# Patient Record
Sex: MALE | Race: WHITE
[De-identification: names, ages, dates, MRNs, and addresses within clinical notes are randomized per-mention and may not be internally consistent; named-entity substitution may affect disease eponyms.]

---

## 2018-11-10 ENCOUNTER — HOSPITAL ENCOUNTER (INPATIENT)
Dept: HOSPITAL 31 - C.ER | Age: 43
LOS: 6 days | Discharge: HOME | DRG: 383 | End: 2018-11-16
Attending: HOSPITALIST | Admitting: HOSPITALIST
Payer: MEDICAID

## 2018-11-10 VITALS — BODY MASS INDEX: 27.1 KG/M2

## 2018-11-10 DIAGNOSIS — N18.9: ICD-10-CM

## 2018-11-10 DIAGNOSIS — L98.0: Primary | ICD-10-CM

## 2018-11-10 DIAGNOSIS — L03.012: ICD-10-CM

## 2018-11-10 DIAGNOSIS — B95.62: ICD-10-CM

## 2018-11-10 DIAGNOSIS — L03.114: ICD-10-CM

## 2018-11-10 DIAGNOSIS — L02.512: ICD-10-CM

## 2018-11-10 LAB
ALBUMIN SERPL-MCNC: 4.2 G/DL (ref 3.5–5)
ALBUMIN/GLOB SERPL: 1.3 {RATIO} (ref 1–2.1)
ALT SERPL-CCNC: 31 U/L (ref 21–72)
APTT BLD: 33 SECONDS (ref 21–34)
AST SERPL-CCNC: 24 U/L (ref 17–59)
BASOPHILS # BLD AUTO: 0.1 K/UL (ref 0–0.2)
BASOPHILS NFR BLD: 1.5 % (ref 0–2)
BILIRUB UR-MCNC: NEGATIVE MG/DL
BUN SERPL-MCNC: 14 MG/DL (ref 9–20)
CALCIUM SERPL-MCNC: 8.8 MG/DL (ref 8.6–10.4)
EOSINOPHIL # BLD AUTO: 0.2 K/UL (ref 0–0.7)
EOSINOPHIL NFR BLD: 2.3 % (ref 0–4)
ERYTHROCYTE [DISTWIDTH] IN BLOOD BY AUTOMATED COUNT: 12.6 % (ref 11.5–14.5)
GFR NON-AFRICAN AMERICAN: > 60
GLUCOSE UR STRIP-MCNC: NORMAL MG/DL
HGB BLD-MCNC: 14.8 G/DL (ref 12–18)
INR PPP: 1.2
LEUKOCYTE ESTERASE UR-ACNC: (no result) LEU/UL
LYMPHOCYTES # BLD AUTO: 1.9 K/UL (ref 1–4.3)
LYMPHOCYTES NFR BLD AUTO: 24.7 % (ref 20–40)
MCH RBC QN AUTO: 30.5 PG (ref 27–31)
MCHC RBC AUTO-ENTMCNC: 34.6 G/DL (ref 33–37)
MCV RBC AUTO: 88.2 FL (ref 80–94)
MONOCYTES # BLD: 0.5 K/UL (ref 0–0.8)
MONOCYTES NFR BLD: 6.9 % (ref 0–10)
NEUTROPHILS # BLD: 4.9 K/UL (ref 1.8–7)
NEUTROPHILS NFR BLD AUTO: 64.6 % (ref 50–75)
NRBC BLD AUTO-RTO: 0 % (ref 0–2)
PH UR STRIP: 6 [PH] (ref 5–8)
PLATELET # BLD: 276 K/UL (ref 130–400)
PMV BLD AUTO: 7.7 FL (ref 7.2–11.7)
PROT UR STRIP-MCNC: NEGATIVE MG/DL
PROTHROMBIN TIME: 12.7 SECONDS (ref 9.7–12.2)
RBC # BLD AUTO: 4.84 MIL/UL (ref 4.4–5.9)
RBC # UR STRIP: NEGATIVE /UL
SP GR UR STRIP: 1.01 (ref 1–1.03)
UROBILINOGEN UR-MCNC: NORMAL MG/DL (ref 0.2–1)
WBC # BLD AUTO: 7.6 K/UL (ref 4.8–10.8)

## 2018-11-10 RX ADMIN — TAZOBACTAM SODIUM AND PIPERACILLIN SODIUM SCH MLS/HR: 375; 3 INJECTION, SOLUTION INTRAVENOUS at 23:05

## 2018-11-10 NOTE — C.PDOC
History Of Present Illness





42 y/o male with no pmx c/o swelling to left hand/thumb area with pus and 

bleeding since earlier this week.  pt states it was small earlier in the week, 

and got worse after wearing a glove that rubbed on it, and putting aloe vera on 

it. pt has been taking keflex bid for since 11/6 with no improvement. no fever. 


Time Seen by Provider: 11/10/18 14:10


Chief Complaint (Nursing): Finger,Hand,&Wrist


History Per: Patient


History/Exam Limitations: no limitations


Onset/Duration Of Symptoms: Days (6)


Current Symptoms Are (Timing): Worse


Quality: "Pain"


Severity: Moderate





Past Medical History


Reviewed: Historical Data, Nursing Documentation, Vital Signs


Vital Signs: 





                                Last Vital Signs











Temp  98.4 F   11/10/18 13:55


 


Pulse  70   11/10/18 15:01


 


Resp  18   11/10/18 15:01


 


BP  136/86   11/10/18 15:01


 


Pulse Ox  97   11/10/18 15:01














- Medical History


PMH: Kidney Stones, Chronic Kidney Disease


Family History: States: Unknown Family Hx, Diabetes





- Social History


Hx Tobacco Use: No


Hx Alcohol Use: Yes


Hx Substance Use: No





- Immunization History


Hx Tetanus Toxoid Vaccination: No


Hx Influenza Vaccination: No


Hx Pneumococcal Vaccination: No





Review Of Systems


Constitutional: Negative for: Fever, Chills


Cardiovascular: Negative for: Chest Pain


Respiratory: Negative for: Cough


Gastrointestinal: Negative for: Abdominal Pain


Skin: Positive for: Lesions (to left hand)


Neurological: Negative for: Weakness, Numbness





Physical Exam





- Physical Exam


Appears: Non-toxic, No Acute Distress


Skin: Warm, Dry, Other (6 cm x 2 cm mass extruding from left  first metacarpal 

that is purulent and bloody , lesion with surrounding erythema, warmth, and 

swelling that extends to dorsum hand and thenar eminence. from of thumb. )


Head: Atraumatic, Normacephalic


Extremity: Normal ROM, Tenderness (left hand), Capillary Refill (less than 2 

seconds. ), Swelling


Neurological/Psych: Oriented x3, Normal Speech, Normal Cognition





ED Course And Treatment





- Laboratory Results


Result Diagrams: 


                                 11/10/18 15:51





                                 11/10/18 15:51


O2 Sat by Pulse Oximetry: 97





Medical Decision Making


Medical Decision Making: 





discussed with Dr Borker, will admit to her service for cellulitis.  Dr Webster 

made aware of consult, requests hand soaked, then wt to dry dressing applied. 





Disposition





- Disposition


Disposition: HOSPITALIZED


Disposition Time: 17:15


Condition: GOOD





- Clinical Impression


Clinical Impression: 


 Cellulitis of hand, left

## 2018-11-11 VITALS — RESPIRATION RATE: 20 BRPM

## 2018-11-11 LAB
ALBUMIN SERPL-MCNC: 3.7 G/DL (ref 3.5–5)
ALBUMIN/GLOB SERPL: 1.2 {RATIO} (ref 1–2.1)
ALT SERPL-CCNC: 28 U/L (ref 21–72)
AST SERPL-CCNC: 23 U/L (ref 17–59)
BASOPHILS # BLD AUTO: 0.1 K/UL (ref 0–0.2)
BASOPHILS NFR BLD: 1.2 % (ref 0–2)
BUN SERPL-MCNC: 14 MG/DL (ref 9–20)
CALCIUM SERPL-MCNC: 8.7 MG/DL (ref 8.6–10.4)
EOSINOPHIL # BLD AUTO: 0.3 K/UL (ref 0–0.7)
EOSINOPHIL NFR BLD: 3.2 % (ref 0–4)
ERYTHROCYTE [DISTWIDTH] IN BLOOD BY AUTOMATED COUNT: 12.6 % (ref 11.5–14.5)
GFR NON-AFRICAN AMERICAN: > 60
HGB BLD-MCNC: 14.5 G/DL (ref 12–18)
LYMPHOCYTES # BLD AUTO: 1.8 K/UL (ref 1–4.3)
LYMPHOCYTES NFR BLD AUTO: 22.1 % (ref 20–40)
MCH RBC QN AUTO: 31.1 PG (ref 27–31)
MCHC RBC AUTO-ENTMCNC: 35.1 G/DL (ref 33–37)
MCV RBC AUTO: 88.7 FL (ref 80–94)
MONOCYTES # BLD: 0.6 K/UL (ref 0–0.8)
MONOCYTES NFR BLD: 7.3 % (ref 0–10)
NEUTROPHILS # BLD: 5.3 K/UL (ref 1.8–7)
NEUTROPHILS NFR BLD AUTO: 66.2 % (ref 50–75)
NRBC BLD AUTO-RTO: 0 % (ref 0–2)
PLATELET # BLD: 261 K/UL (ref 130–400)
PMV BLD AUTO: 8.1 FL (ref 7.2–11.7)
RBC # BLD AUTO: 4.66 MIL/UL (ref 4.4–5.9)
WBC # BLD AUTO: 8 K/UL (ref 4.8–10.8)

## 2018-11-11 RX ADMIN — TAZOBACTAM SODIUM AND PIPERACILLIN SODIUM SCH MLS/HR: 375; 3 INJECTION, SOLUTION INTRAVENOUS at 10:46

## 2018-11-11 RX ADMIN — TAZOBACTAM SODIUM AND PIPERACILLIN SODIUM SCH MLS/HR: 375; 3 INJECTION, SOLUTION INTRAVENOUS at 17:56

## 2018-11-11 RX ADMIN — TAZOBACTAM SODIUM AND PIPERACILLIN SODIUM SCH MLS/HR: 375; 3 INJECTION, SOLUTION INTRAVENOUS at 04:36

## 2018-11-11 RX ADMIN — TAZOBACTAM SODIUM AND PIPERACILLIN SODIUM SCH MLS/HR: 375; 3 INJECTION, SOLUTION INTRAVENOUS at 22:22

## 2018-11-11 RX ADMIN — VANCOMYCIN HYDROCHLORIDE SCH MLS/HR: 1 INJECTION, POWDER, LYOPHILIZED, FOR SOLUTION INTRAVENOUS at 10:45

## 2018-11-11 NOTE — RAD
Date of service: 



11/10/2018



HISTORY:

 Pre-Op 



COMPARISON:

No prior



TECHNIQUE:

Chest PA and lateral



FINDINGS:



LUNGS:

Poor inspiration with low lung volumes, crowded bronchovascular 

markings and mild bibasilar atelectasis. 



PLEURA:

No significant pleural effusion identified. No pneumothorax apparent.



CARDIOVASCULAR:

No aortic atherosclerotic calcification present.



Normal cardiac size. No pulmonary vascular congestion. 



OSSEOUS STRUCTURES:

No significant abnormalities.



VISUALIZED UPPER ABDOMEN:

Normal.



OTHER FINDINGS:

None.



IMPRESSION:

Poor inspiration with low lung volumes, crowded bronchovascular 

markings and mild bibasilar atelectasis. .

## 2018-11-11 NOTE — CP.PCM.CON
History of Present Illness





- History of Present Illness


History of Present Illness: 





 43 year old male  presents with complaints left hand/thumb swelling/pain   Says

his wound began as a bump on the evening of 11/5/18 and worsened.   He 

eventually went to a clinic and was prescribed Keflex 500 BID, which he has been

taking since 11/6.  Pain and swelling persisted 











PMHx: Nephrolithiasis


PSHx: Possible Colonoscopy 7-8 years ago


Allergies: NKDA


SocialHx: Works as a construction work, smokes Cigars social (hasn't smoked any 

in 3 months), Social EtOH Use


FamHx: Father/Mother - HTN, Diabetes








Review of Systems





- Review of Systems


All systems: reviewed and no additional remarkable complaints except





- Constitutional


Constitutional: As Per HPI





- EENT


Eyes: absent: As Per HPI, Blind Spots, Blurred Vision, Change in Vision, 

Decreased Night Vision, Diplopia, Discharge, Dry Eye, Exophthalmos, Floaters, 

Irritation, Itchy Eyes, Loss of Peripheral Vision, Pain, Photophobia, Requires 

Corrective Lenses, Sees Flashes, Spots in Vision, Tunnel Vision, Other Visual 

Disturbances, Loss of Vision, Other


Ears: absent: As Per HPI, Decreased Hearing, Ear Discharge, Ear Pain, Tinnitus, 

Abnormal Hearing, Disequilibrium, Dizziness, Other


Nose/Mouth/Throat: absent: As Per HPI, Epistaxis, Nasal Congestion, Nasal 

Discharge, Nasal Obstruction, Nasal Trauma, Nose Pain, Post Nasal Drip, Sinus 

Pain, Sinus Pressure, Bleeding Gums, Change in Voice, Dental Pain, Dry Mouth, 

Dysphagia, Halitosis, Hoarsness, Lip Swelling, Mouth Lesions, Mouth Pain, 

Odynophagia, Sore Throat, Throat Swelling, Tongue Swelling, Facial Pain, Neck 

Pain, Neck Mass, Other





- Cardiovascular


Cardiovascular: absent: As Per HPI, Acrocyanosis, Chest Pain, Chest Pain at 

Rest, Chest Pain with Activity, Claudication, Diaphoresis, Dyspnea, Dyspnea on 

Exertion, Edema, Irregular Heart Rhythm, Pain Radiating to Arm/Neck/Jaw, Leg 

Edema, Leg Ulcers, Lightheadedness, Orthopnea, Palpitations, Paroxysmal Noctur

nal Dyspnea, Pedal Edema, Radiating Pain, Rapid Heart Rate, Slow Heart Rate, 

Syncope, Other





- Respiratory


Respiratory: absent: As Per HPI, Cough, Dyspnea, Hemoptysis, Dyspnea on 

Exertion, Wheezing, Snoring, Stridor, Pain on Inspiration, Chest Congestion, 

Excessive Mucous Production, Change in Mucous Color, Pain with Coughing, Other





- Gastrointestinal


Gastrointestinal: absent: As Per HPI, Abdominal Pain, Belching, Bloating, Change

in Bowel Habits, Change in Stool Character, Coffee Ground Emesis, Constipation, 

Cramping, Diarrhea, Dyspepsia, Dysphagia, Early Satiety, Excessive Flatus, Fecal

Incontinence, Heartburn, Hematemesis, Hematochezia, Loose Stools, Melena, 

Nausea, Odynophagia, Temesmus, Vomiting, Other





- Genitourinary


Genitourinary: absent: As Per HPI, Change in Urinary Stream, Difficulty 

Urinating, Dysuria, Flank Pain, Hematuria, Pyuria, Nocturia, Urinary Incontinen

ce, Urinary Frequency, Urinary Hesitance, Urinary Urgency, Voiding Freq/Small 

Amts, Freq UTI, Hx Renal/Bladder Calculi, Hx /Renal Surgery, Bladder 

Distension, Other





- Musculoskeletal


Musculoskeletal: As Per HPI





- Integumentary


Integumentary: As Per HPI, Skin Pain, Wounds





- Neurological


Neurological: absent: As Per HPI, Abnormal Gait, Abnormal Hearing, Abnormal 

Movements, Abnormal Speech, Behavioral Changes, Burning Sensations, Confusion, 

Convulsions, Disequilibrium, Dizziness, Numbness, Focal Weakness, Frequent 

Falls, Headaches, Lack of Coordination, Loss of Vision, Memory Loss, 

Paresthesias, Radicular Pain, Restless Legs, Sensory Deficit, Syncope, Tingling,

Tremor, Vertigo, Weakness, Other Visual Disturbances, Other





- Psychiatric


Psychiatric: absent: As Per HPI, Abnormal Sleep Pattern, Anhedonia, Anxiety, 

Auditory Hallucinations, Behavioral Changes, Change in Appetite, Change in 

Libido, Confusion, Depression, Difficulty Concentrating, Hallucinations, 

Homicidal Ideation, Hopelessness, Irritability, Memory Loss, Mood Swings, Panic 

Attacks, Paranoia, Suicidal Ideation, Visual Hallucinations, Tactile 

Hallucinations, Other





- Endocrine


Endocrine: absent: As Per HPI, Change in Body Appearance, Change in Libido, Cold

Intolorance, Deepening of Voice, Excessive Sweating, Fatigue, Flushing, Heat 

Intolorance, Increase in Ring/Shoe/Hat Size, Palpitations, Polydipsia, 

Polyphagia, Polyuria, Other





- Hematologic/Lymphatic


Hematologic: absent: As Per HPI, Easy Bleeding, Easy Bruising, Lymphadenopathy, 

Other





Past Patient History





- Past Social History


Smoking Status: Never Smoked





- RENAL


Hx Chronic Kidney Disease: Yes


Hx Kidney Stones: Yes





- MUSCULOSKELETAL/RHEUMATOLOGICAL


Hx Falls: No





- PSYCHIATRIC


Hx Substance Use: No





- SURGICAL HISTORY


Hx Surgeries: No





- ANESTHESIA


Hx Anesthesia: No


Hx Anesthesia Reactions: No


Hx Malignant Hyperthermia: No





Meds


Allergies/Adverse Reactions: 


                                    Allergies











Allergy/AdvReac Type Severity Reaction Status Date / Time


 


No Known Allergies Allergy   Verified 11/10/18 13:53














- Medications


Medications: 


                               Current Medications





Acetaminophen (Tylenol 325mg Tab)  650 mg PO Q6 PRN


   PRN Reason: Pain or Fever


Vancomycin/Sodium Chloride (Vancomycin 1 Gm/Ns 200 Ml)  1 gm in 200 mls @ 166.7 

mls/hr IVPB Q24H PUNEET; Protocol


   Stop: 11/16/18 10:01


   Last Admin: 11/11/18 10:45 Dose:  166.7 mls/hr


Piperacillin Sod/Tazobactam Sod (Zosyn 3.375 Gm Iv Premix)  3.375 gm in 50 mls @

100 mls/hr IVPB Q6H PUNEET; Protocol


   Last Admin: 11/11/18 10:46 Dose:  100 mls/hr


Sodium Chloride (Sodium Chloride 0.9%)  1,000 mls @ 75 mls/hr IV .[id number] PUNEET


   Last Admin: 11/11/18 14:36 Dose:  75 mls/hr











Physical Exam





- Constitutional


Appears: Non-toxic, Chronically Ill





- Head Exam


Head Exam: NORMOCEPHALIC





- Eye Exam


Eye Exam: absent: Scleral icterus





- ENT Exam


ENT Exam: Mucous Membranes Dry





- Neck Exam


Neck exam: Negative for: Lymphadenopathy





- Respiratory Exam


Respiratory Exam: Decreased Breath Sounds





- Cardiovascular Exam


Cardiovascular Exam: REGULAR RHYTHM





- GI/Abdominal Exam


GI & Abdominal Exam: Diminished Bowel Sounds, Soft.  absent: Tenderness





- Rectal Exam


Rectal Exam: Deferred





-  Exam


 Exam: NORMAL INSPECTION





- Extremities Exam


Extremities exam: Negative for: pedal edema





- Back Exam


Back exam: absent: CVA tenderness (L), CVA tenderness (R)





- Neurological Exam


Neurological exam: Alert, CN II-XII Intact, Oriented x3, Reflexes Normal





- Psychiatric Exam


Psychiatric exam: Normal Mood





- Skin


Additional comments: 





+ erythema  swelling and pus from left hand


pulses and neurologic exam wnl 





Results





- Vital Signs


Recent Vital Signs: 


                                Last Vital Signs











Temp  99.3 F   11/11/18 16:00


 


Pulse  68   11/11/18 16:00


 


Resp  20   11/11/18 16:00


 


BP  121/79   11/11/18 16:00


 


Pulse Ox  96   11/11/18 16:00














- Labs


Result Diagrams: 


                                 11/11/18 08:17





                                 11/11/18 08:17


Labs: 


                         Laboratory Results - last 24 hr











  11/10/18 11/11/18 11/11/18





  20:03 08:17 08:17


 


WBC   8.0 


 


RBC   4.66 


 


Hgb   14.5 


 


Hct   41.4 


 


MCV   88.7 


 


MCH   31.1 H 


 


MCHC   35.1 


 


RDW   12.6 


 


Plt Count   261 


 


MPV   8.1 


 


Neut % (Auto)   66.2 


 


Lymph % (Auto)   22.1 


 


Mono % (Auto)   7.3 


 


Eos % (Auto)   3.2 


 


Baso % (Auto)   1.2 


 


Neut # (Auto)   5.3 


 


Lymph # (Auto)   1.8 


 


Mono # (Auto)   0.6 


 


Eos # (Auto)   0.3 


 


Baso # (Auto)   0.1 


 


Sodium    140


 


Potassium    3.6


 


Chloride    103


 


Carbon Dioxide    28


 


Anion Gap    12


 


BUN    14


 


Creatinine    0.9


 


Est GFR ( Amer)    > 60


 


Est GFR (Non-Af Amer)    > 60


 


Random Glucose    86


 


Hemoglobin A1c  5.6  


 


Calcium    8.7


 


Phosphorus    3.9


 


Magnesium    2.1


 


Total Bilirubin    1.1


 


AST    23


 


ALT    28


 


Alkaline Phosphatase    74


 


Total Protein    6.8


 


Albumin    3.7


 


Globulin    3.1


 


Albumin/Globulin Ratio    1.2














Assessment & Plan


(1) Cellulitis of hand, left


Status: Acute   





- Assessment and Plan (Free Text)


Assessment: 





abscess left hand with cellulitis- draining 





consider I and D  if swelling persists 


IV antibiotics


await cultures


wound care


tetanus toxoid if not given

## 2018-11-11 NOTE — CP.PCM.PN
<Leodan Sierra - Last Filed: 11/11/18 13:50>





Subjective





- Date & Time of Evaluation


Date of Evaluation: 11/11/18


Time of Evaluation: 10:40





- Subjective


Subjective: 


Patient seen and examined at bedside. No overnight events reported. Patient 

denies any fever, chills, chest pain, SOB, abdominal pain, n/v/d, constipation, 

or any urinary symptoms.  Hand pain has decreased.  








Objective





- Vital Signs/Intake and Output


Vital Signs (last 24 hours): 


                                        











Temp Pulse Resp BP Pulse Ox


 


 98.9 F   58 L  20   117/77   95 


 


 11/11/18 08:00  11/11/18 08:00  11/11/18 08:00  11/11/18 08:00  11/11/18 08:00








Intake and Output: 


                                        











 11/11/18 11/11/18





 06:59 18:59


 


Intake Total 1000 


 


Balance 1000 














- Medications


Medications: 


                               Current Medications





Acetaminophen (Tylenol 325mg Tab)  650 mg PO Q6 PRN


   PRN Reason: Pain or Fever


Vancomycin/Sodium Chloride (Vancomycin 1 Gm/Ns 200 Ml)  1 gm in 200 mls @ 166.7 

mls/hr IVPB Q24H PUNEET; Protocol


   Stop: 11/16/18 10:01


   Last Admin: 11/11/18 10:45 Dose:  166.7 mls/hr


Piperacillin Sod/Tazobactam Sod (Zosyn 3.375 Gm Iv Premix)  3.375 gm in 50 mls @

100 mls/hr IVPB Q6H PUNEET; Protocol


   Last Admin: 11/11/18 10:46 Dose:  100 mls/hr


Sodium Chloride (Sodium Chloride 0.9%)  1,000 mls @ 125 mls/hr IV .Q8H PUNEET


   Last Admin: 11/11/18 03:02 Dose:  125 mls/hr











- Labs


Labs: 


                                        





                                 11/11/18 08:17 





                                 11/11/18 08:17 





                                        











PT  12.7 SECONDS (9.7-12.2)  H  11/10/18  16:43    


 


INR  1.2   11/10/18  16:43    


 


APTT  33 SECONDS (21-34)   11/10/18  16:43    














- Additional Findings


Additional findings: 


- Constitutional


Appears: Well, Non-toxic, No Acute Distress





- Head Exam


Head Exam: ATRAUMATIC, NORMAL INSPECTION, NORMOCEPHALIC





- Eye Exam


Eye Exam: EOMI, Normal appearance.  absent: Scleral icterus





- ENT Exam


ENT Exam: Mucous Membranes Moist





- Neck Exam


Neck exam: Positive for: Normal Inspection.  Negative for: Lymphadenopathy





- Respiratory Exam


Respiratory Exam: Clear to Auscultation Bilateral, NORMAL BREATHING PATTERN.  

absent: Rales, Rhonchi, Wheezes





- Cardiovascular Exam


Cardiovascular Exam: RRR, +S1, +S2





- GI/Abdominal Exam


GI & Abdominal Exam: Normal Bowel Sounds, Soft.  absent: Tenderness





- Extremities Exam


Extremities exam: Positive for: normal capillary refill, pedal pulses present.  

Negative for: pedal edema


Additional comments: 


Left Hand/Thumb. Swollen, Erythematous with sanguinopurulent drainage. Tender to

palpation. Irregular border 


NO axillary lymphadenopathy


Left axilla with two papular, erythematous masses about .5inch in diameter each.

Tender to palpation, fluctuant. 





- Back Exam


Back exam: NORMAL INSPECTION





- Neurological Exam


Neurological exam: Alert, Oriented x3





- Psychiatric Exam


Psychiatric exam: Normal Affect, Normal Mood





- Skin


Skin Exam: Warm








Assessment and Plan





- Assessment and Plan (Free Text)


Assessment: 


43 year old male with PMHx of nephrolithiasis admitted for evaluation and 

treatment of left hand/thumb cellulitis.  





Plan: 


Left Hand/Thumb Cellulitis


No fever/Leukocytosis


Left Hand X-ray (Admission): PENDING Official Read, No fractures, dislocations, 

or evidence of osteomyelitis noted.  


Left Hand CT (11/10/18): Findings are consistent with a small elliptical shaped 

abscess collection with surrounding is infiltration changes consistent with 

cellulitis in the soft tissues of the lateral hand (radial aspect) at the level 

of the 1st metacarpal extending from distal metacarpal proximally to the level 

of the triquetrum..  No evidence of subcutaneous emphysema.  No definitive bony 

destructive changes suggest osteomyelitis at this time however early 

osteomyelitis not excluded.  Follow-up MRI may be prudent for further evaluation


Hand Surgery Consulted (Dr. Thomson), F/U. ---Wet to Dry Dressing 


ID Consulted (Dr. Toro), F/U


ED: Clindamycin, Zosyn, Vancomycin,


Wound Culture (11/10/18): POSITIVE for Gram (+) Gocci


Blood Cultures: PENDING 





Meds:


   Vancomycin 1 gram Daily IV


   Zosyn 3.375 Q6H IV 


   Tylenol


   NS @ 75mls/HR


   Tdap IM Given Once 





Proph


NO pharmocological DVT proph in case of surgery


SCD's


GI Proph Not Indicated 








<Husam George - Last Filed: 11/11/18 14:35>





Objective





- Vital Signs/Intake and Output


Vital Signs (last 24 hours): 


                                        











Temp Pulse Resp BP Pulse Ox


 


 98.9 F   58 L  20   117/77   95 


 


 11/11/18 08:00  11/11/18 08:00  11/11/18 08:00  11/11/18 08:00  11/11/18 08:00








Intake and Output: 


                                        











 11/11/18 11/11/18





 06:59 18:59


 


Intake Total 1000 


 


Balance 1000 














- Medications


Medications: 


                               Current Medications





Acetaminophen (Tylenol 325mg Tab)  650 mg PO Q6 PRN


   PRN Reason: Pain or Fever


Vancomycin/Sodium Chloride (Vancomycin 1 Gm/Ns 200 Ml)  1 gm in 200 mls @ 166.7 

mls/hr IVPB Q24H PUNEET; Protocol


   Stop: 11/16/18 10:01


   Last Admin: 11/11/18 10:45 Dose:  166.7 mls/hr


Piperacillin Sod/Tazobactam Sod (Zosyn 3.375 Gm Iv Premix)  3.375 gm in 50 mls @

100 mls/hr IVPB Q6H PUNEET; Protocol


   Last Admin: 11/11/18 10:46 Dose:  100 mls/hr


Sodium Chloride (Sodium Chloride 0.9%)  1,000 mls @ 75 mls/hr IV .[id number] PUNEET











- Labs


Labs: 


                                        





                                 11/11/18 08:17 





                                 11/11/18 08:17 





                                        











PT  12.7 SECONDS (9.7-12.2)  H  11/10/18  16:43    


 


INR  1.2   11/10/18  16:43    


 


APTT  33 SECONDS (21-34)   11/10/18  16:43    














Attending/Attestation





- Attestation


I have personally seen and examined this patient.: Yes


I have fully participated in the care of the patient.: Yes


I have reviewed all pertinent clinical information, including history, physical 

exam and plan: Yes


Notes (Text): 





Patient seen and examined, agree with above


improved pain, continue with supportive care and broad spectrum Abx until blood 

cx reports available


will likely need I&D

## 2018-11-11 NOTE — CT
Date of service: 



11/10/2018



PROCEDURE:  CT of the left hand 



HISTORY:

Left Hand Cellulitis



COMPARISON:

None available.



TECHNIQUE:

Contiguous axial images of the le left hand hip were obtained 

following intravenous injection of approximately 100 cc Visipaque 

320.  Coronal and sagittal reformats were generated.



Radiation dose:



Total exam DLP = 279.11 mGy-cm.



This CT exam was performed using one or more of the following dose 

reduction techniques: Automated exposure control, adjustment of the 

mA and/or kV according to patient size, and/or use of iterative 

reconstruction technique.



FINDINGS:



BONES:

The current study reveals no evidence of acute displaced fracture nor 

dislocation.  The osseous structures appear intact.  No obvious 

cortical destructive changes seen at this time to suggest 

osteomyelitis however consider follow-up MRI if early osteomyelitis 

suspected clinically as this cannot be completely excluded based on 

the current study.



No evidence of significant osteoarthritis. 



SOFT TISSUES:

There is a long somewhat rectangular/elliptical shaped hypodense 

fluid collection within the lateral (radial aspect) soft tissues of 

the left hand extending from the distal aspect of the 1st metacarpal 

proximally to the level of the triquetrum.  This collection measures 

approximately 3.8 x 1.39 x 0.74 cm.  Collection extends to the 

lateral volar skin surface with possible draining site best seen on 

axial series 3 image number 128 through 132.  There is surrounding 

infiltration changes in the adjacent subcutaneous tissues and 

musculature consistent with cellulitis.. 



No evidence of subcutaneous emphysema 



IMPRESSION:

Findings are consistent with a small elliptical shaped abscess 

collection with surrounding is infiltration changes consistent with 

cellulitis in the soft tissues of the lateral hand (radial aspect) at 

the level of the 1st metacarpal extending from distal metacarpal 

proximally to the level of the triquetrum..  No evidence of 

subcutaneous emphysema.  No definitive bony destructive changes 

suggest osteomyelitis at this time however early osteomyelitis not 

excluded.  Follow-up MRI may be prudent for further evaluation



These findings are concordant with preliminary radiology reading.

## 2018-11-11 NOTE — RAD
PROCEDURE:  Left Hand Radiographs.



HISTORY:

 mass on thumb 



COMPARISON:

None.



FINDINGS:



BONES:

No evidence of acute displaced fracture nor dislocation.  Osseous 

structures appear intact.  No definitive cortical destructive 

changes.. 



JOINTS:

Normal. No osteoarthritic changes. 



SOFT TISSUES:

There is localized enlarged heterogeneous appearance of the soft 

tissues adjacent to the radial margin of the 1st metacarpal (with 

presumed involvement of the thenar eminence) and extending to the 

level of the triquetrum.  Findings may represent a cellulitis and 

possible abscess.  Follow-up additional imaging such as CT scan with 

contrast recommended. 



OTHER FINDINGS:

None.



IMPRESSION:

There is localized enlarged heterogeneous appearance of the soft 

tissues adjacent to the radial margin of the 1st metacarpal (with 

presumed involvement of the thenar eminence) and extending to the 

level of the triquetrum.  Findings may represent a cellulitis and 

possible abscess.  Follow-up additional imaging such as CT scan with 

contrast recommended.

## 2018-11-12 LAB
ALBUMIN SERPL-MCNC: 3.7 G/DL (ref 3.5–5)
ALBUMIN/GLOB SERPL: 1.2 {RATIO} (ref 1–2.1)
ALT SERPL-CCNC: 27 U/L (ref 21–72)
AST SERPL-CCNC: 24 U/L (ref 17–59)
BASOPHILS # BLD AUTO: 0.1 K/UL (ref 0–0.2)
BASOPHILS NFR BLD: 1 % (ref 0–2)
BUN SERPL-MCNC: 14 MG/DL (ref 9–20)
CALCIUM SERPL-MCNC: 8.5 MG/DL (ref 8.6–10.4)
EOSINOPHIL # BLD AUTO: 0.3 K/UL (ref 0–0.7)
EOSINOPHIL NFR BLD: 3.4 % (ref 0–4)
ERYTHROCYTE [DISTWIDTH] IN BLOOD BY AUTOMATED COUNT: 12.5 % (ref 11.5–14.5)
GFR NON-AFRICAN AMERICAN: > 60
HGB BLD-MCNC: 14.5 G/DL (ref 12–18)
LYMPHOCYTES # BLD AUTO: 1.9 K/UL (ref 1–4.3)
LYMPHOCYTES NFR BLD AUTO: 23.3 % (ref 20–40)
MCH RBC QN AUTO: 30.9 PG (ref 27–31)
MCHC RBC AUTO-ENTMCNC: 34.8 G/DL (ref 33–37)
MCV RBC AUTO: 88.9 FL (ref 80–94)
MONOCYTES # BLD: 0.7 K/UL (ref 0–0.8)
MONOCYTES NFR BLD: 8.1 % (ref 0–10)
NEUTROPHILS # BLD: 5.3 K/UL (ref 1.8–7)
NEUTROPHILS NFR BLD AUTO: 64.2 % (ref 50–75)
NRBC BLD AUTO-RTO: 0.1 % (ref 0–2)
PLATELET # BLD: 258 K/UL (ref 130–400)
PMV BLD AUTO: 8 FL (ref 7.2–11.7)
RBC # BLD AUTO: 4.7 MIL/UL (ref 4.4–5.9)
WBC # BLD AUTO: 8.3 K/UL (ref 4.8–10.8)

## 2018-11-12 RX ADMIN — TAZOBACTAM SODIUM AND PIPERACILLIN SODIUM SCH MLS/HR: 375; 3 INJECTION, SOLUTION INTRAVENOUS at 11:55

## 2018-11-12 RX ADMIN — VANCOMYCIN HYDROCHLORIDE SCH: 1 INJECTION, POWDER, LYOPHILIZED, FOR SOLUTION INTRAVENOUS at 12:23

## 2018-11-12 RX ADMIN — VANCOMYCIN HYDROCHLORIDE SCH MLS/HR: 1 INJECTION, POWDER, LYOPHILIZED, FOR SOLUTION INTRAVENOUS at 23:32

## 2018-11-12 RX ADMIN — TAZOBACTAM SODIUM AND PIPERACILLIN SODIUM SCH MLS/HR: 375; 3 INJECTION, SOLUTION INTRAVENOUS at 05:05

## 2018-11-12 RX ADMIN — TAZOBACTAM SODIUM AND PIPERACILLIN SODIUM SCH MLS/HR: 375; 3 INJECTION, SOLUTION INTRAVENOUS at 22:13

## 2018-11-12 RX ADMIN — TAZOBACTAM SODIUM AND PIPERACILLIN SODIUM SCH MLS/HR: 375; 3 INJECTION, SOLUTION INTRAVENOUS at 17:54

## 2018-11-12 RX ADMIN — VANCOMYCIN HYDROCHLORIDE SCH MLS/HR: 1 INJECTION, POWDER, LYOPHILIZED, FOR SOLUTION INTRAVENOUS at 10:16

## 2018-11-12 NOTE — CP.PCM.PN
Subjective





- Date & Time of Evaluation


Date of Evaluation: 11/12/18


Time of Evaluation: 08:00





- Subjective


Subjective: 





wound + for MRSA


consider MRI


cont Vanco for now


surgical eval 








Objective





- Vital Signs/Intake and Output


Vital Signs (last 24 hours): 


                                        











Temp Pulse Resp BP Pulse Ox


 


 98.5 F   73   20   131/88   95 


 


 11/12/18 07:00  11/12/18 07:00  11/12/18 07:00  11/12/18 07:00  11/12/18 07:00








Intake and Output: 


                                        











 11/12/18 11/12/18





 06:59 18:59


 


Intake Total 1300 


 


Balance 1300 














- Medications


Medications: 


                               Current Medications





Acetaminophen (Tylenol 325mg Tab)  650 mg PO Q6 PRN


   PRN Reason: Pain or Fever


Vancomycin/Sodium Chloride (Vancomycin 1 Gm/Ns 200 Ml)  1 gm in 200 mls @ 166.7 

mls/hr IVPB Q24H PUNEET; Protocol


   Stop: 11/16/18 10:01


   Last Admin: 11/12/18 10:16 Dose:  166.7 mls/hr


Piperacillin Sod/Tazobactam Sod (Zosyn 3.375 Gm Iv Premix)  3.375 gm in 50 mls @

100 mls/hr IVPB Q6H PUNEET; Protocol


   Last Admin: 11/12/18 05:05 Dose:  100 mls/hr


Sodium Chloride (Sodium Chloride 0.9%)  1,000 mls @ 75 mls/hr IV .[id number] PUNEET


   Last Admin: 11/12/18 03:16 Dose:  75 mls/hr











- Labs


Labs: 


                                        





                                 11/12/18 06:43 





                                 11/12/18 06:43 





                                        











PT  12.7 SECONDS (9.7-12.2)  H  11/10/18  16:43    


 


INR  1.2   11/10/18  16:43    


 


APTT  33 SECONDS (21-34)   11/10/18  16:43    














Assessment and Plan


(1) Cellulitis of hand, left


Status: Acute

## 2018-11-12 NOTE — CP.PCM.PN
Subjective





- Date & Time of Evaluation


Date of Evaluation: 11/12/18


Time of Evaluation: 07:23





- Subjective


Subjective: 





Progress note for Hospitalist service 





Patient was seen and evaluated at bedside. He states that his pain is currently 

controlled at this time. He denies fevers, chills, chest pain, shortness of 

breath, abdominal pain, nausea, vomiting, diarrhea, dysuria, leg swelling. 





Objective





- Vital Signs/Intake and Output


Vital Signs (last 24 hours): 


                                        











Temp Pulse Resp BP Pulse Ox


 


 98.9 F   77   20   129/76   95 


 


 11/12/18 04:57  11/12/18 00:00  11/12/18 00:00  11/12/18 00:00  11/12/18 00:00








Intake and Output: 


                                        











 11/12/18 11/12/18





 06:59 18:59


 


Intake Total 1300 


 


Balance 1300 














- Medications


Medications: 


                               Current Medications





Acetaminophen (Tylenol 325mg Tab)  650 mg PO Q6 PRN


   PRN Reason: Pain or Fever


Vancomycin/Sodium Chloride (Vancomycin 1 Gm/Ns 200 Ml)  1 gm in 200 mls @ 166.7 

mls/hr IVPB Q24H [facility]; Protocol


   Stop: 11/16/18 10:01


   Last Admin: 11/11/18 10:45 Dose:  166.7 mls/hr


Piperacillin Sod/Tazobactam Sod (Zosyn 3.375 Gm Iv Premix)  3.375 gm in 50 mls @

100 mls/hr IVPB Q6H [facility]; Protocol


   Last Admin: 11/12/18 05:05 Dose:  100 mls/hr


Sodium Chloride (Sodium Chloride 0.9%)  1,000 mls @ 75 mls/hr IV .[id number] [facility]


   Last Admin: 11/12/18 03:16 Dose:  75 mls/hr











- Labs


Labs: 


                                        





                                 11/12/18 06:43 





                                 11/11/18 08:17 





                                        











PT  12.7 SECONDS (9.7-12.2)  H  11/10/18  16:43    


 


INR  1.2   11/10/18  16:43    


 


APTT  33 SECONDS (21-34)   11/10/18  16:43    














- Constitutional


Appears: Well, No Acute Distress





- Head Exam


Head Exam: ATRAUMATIC, NORMOCEPHALIC





- Eye Exam


Eye Exam: EOMI





- ENT Exam


ENT Exam: Mucous Membranes Moist





- Neck Exam


Neck Exam: Full ROM





- Respiratory Exam


Respiratory Exam: Clear to Ausculation Bilateral.  absent: Rales, Rhonchi, 

Wheezes, Respiratory Distress, Stridor





- Cardiovascular Exam


Cardiovascular Exam: REGULAR RHYTHM, +S1, +S2.  absent: Gallop, Rubs, Murmur





- GI/Abdominal Exam


GI & Abdominal Exam: Soft, Normal Bowel Sounds.  absent: Distended, Firm, 

Guarding, Rigid, Tenderness





- Extremities Exam


Extremities Exam: Normal Capillary Refill.  absent: Calf Tenderness, Pedal Edema


Additional comments: 





Left hand: Edema with purulent drainage with tenderness along radial aspect of 

left thumb at the level of metacarpal. Sensation of digits on left hand intact. 

Able to make a fist with left hand. Capillary refill <2 seconds. 


Left axilla: small papular erythematous fluctuant region that are mildly tender 

to palpation. No axillary lymphadenopathy. 





- Neurological Exam


Neurological Exam: Alert, Awake, Oriented x3





- Psychiatric Exam


Psychiatric exam: Normal Affect, Normal Mood





- Skin


Skin Exam: Dry, Intact, Warm





Assessment and Plan





- Assessment and Plan (Free Text)


Plan: 





Assessment: 


43 year old male with past medical history of nephrolithiasis admitted for 

evaluation and treatment of left hand/thumb cellulitis.  





Plan: 


Left Hand/Thumb Cellulitis


- Remains afebrile


- White count 8.3


- Left Hand X-ray:  There is localized enlarged heterogeneous appearance of the 

soft tissues adjacent to the radial margin of the 1st metacarpal with presumed 

involvement of the thenar eminence and extending to the level of the triquetrum.

Findings may represent a cellulitis and possible abscess. Follow up additional 

imaging such as a CT with IV contrast is recommended. 


- Left Hand CT (11/10/18): Findings are consistent with a small elliptical 

shaped abscess collection with surrounding is infiltration changes consistent 

with cellulitis in the soft tissues of the lateral hand (radial aspect) at the 

level of the 1st metacarpal extending from distal metacarpal proximally to the 

level of the triquetrum..  No evidence of subcutaneous emphysema.  No definitive

bony destructive changes suggest osteomyelitis at this time however early osteo

myelitis not excluded.  Follow-up MRI may be prudent for further evaluation


- f/u left hand MRI results 


- HIV negative 


- Hand Surgery Consulted (Dr. Thomson), help appreciated 


- ID Consulted (Dr. Toro), help appreciated 


- In ED patient received Clindamycin, Zosyn, Vancomycin


- Wound Culture (11/10/18): positive for MRSA


- Blood Cultures no growth x48 hours 


- Meds:


   Vancomycin 1 gram IV Q12


   Zosyn 3.375 Q6H IV 


   Tylenol 650mg PO Q6 PRN 


   Tdap IM Given Once 





Proph


NO pharmocological DVT proph in case of surgery


SCD's


GI Proph Not Indicated 





Case discussed with Dr. Steph Velez, PGY1

## 2018-11-12 NOTE — CP.PCM.CON
History of Present Illness





- History of Present Illness


History of Present Illness: 





Orthopedic consultation Dr. Thomson





43M complains of pain in left hand x approx 1 week. He says it started as a 

little red bump, and then it got bigger, and shows pictures of small abscess. He

says that was about a week ago. It then opened up and drained pus, and when it 

didn't improve on PO keflex started on 11/6 he came to ER. He denies any known 

insect bites. He works in construction and wears gloves. He says now he has 

little pain. Denies numbness/tingling. RHD. He also complains of painful bump in

his left underarm which started after the bump on his thumb. He had a fever at 

home. This is the first time he has had a problem like this. No trauma, falls, 

burns, prior injury to hand. 





PMH: nephrolithiasis


NKDA





Review of Systems





- Review of Systems


All systems: reviewed and no additional remarkable complaints except





- Constitutional


Constitutional: As Per HPI





- Musculoskeletal


Musculoskeletal: As Per HPI





- Integumentary


Integumentary: As Per HPI





- Neurological


Neurological: As Per HPI





Past Patient History





- Past Medical History & Family History


Past Medical History?: Yes


Past Family History: Reviewed and not pertinent





- Past Social History


Smoking Status: Never Smoked





- RENAL


Hx Chronic Kidney Disease: Yes


Hx Kidney Stones: Yes





- MUSCULOSKELETAL/RHEUMATOLOGICAL


Hx Falls: No





- PSYCHIATRIC


Hx Substance Use: No





- SURGICAL HISTORY


Hx Surgeries: No





- ANESTHESIA


Hx Anesthesia: No


Hx Anesthesia Reactions: No


Hx Malignant Hyperthermia: No





Meds


Allergies/Adverse Reactions: 


                                    Allergies











Allergy/AdvReac Type Severity Reaction Status Date / Time


 


No Known Allergies Allergy   Verified 11/10/18 13:53














- Medications


Medications: 


                               Current Medications





Acetaminophen (Tylenol 325mg Tab)  650 mg PO Q6 PRN


   PRN Reason: Pain or Fever


Piperacillin Sod/Tazobactam Sod (Zosyn 3.375 Gm Iv Premix)  3.375 gm in 50 mls @

100 mls/hr IVPB Q6H [facility]; Protocol


   Last Admin: 11/12/18 11:55 Dose:  100 mls/hr


Sodium Chloride (Sodium Chloride 0.9%)  1,000 mls @ 75 mls/hr IV .[id number] [facility]


   Last Admin: 11/12/18 03:16 Dose:  75 mls/hr


Vancomycin/Sodium Chloride (Vancomycin 1 Gm/Ns 200 Ml)  1 gm in 200 mls @ 166.6 

mls/hr IVPB Q12H [facility]; Protocol


   Stop: 11/17/18 12:31


   Last Admin: 11/12/18 12:23 Dose:  Not Given











Physical Exam





- Constitutional


Appears: Well, No Acute Distress





- Head Exam


Head Exam: ATRAUMATIC





- Neck Exam


Neck exam: Positive for: Full Rom, Normal Inspection





- Respiratory Exam


Respiratory Exam: NORMAL BREATHING PATTERN





- Cardiovascular Exam


Additional comments: 





+radial pulse





- Extremities Exam


Additional comments: 





Relatively non tender. No tenderness to IP joint, MCP joint, basal joint. Full 

ROM of thumb without pain. 5/5 strength to flex/ext/abd/add of thumb. No pain to

1st webspace or rest of hand. cap refill < 2 sec to thumb. sensation intact. 

saline wet to dry placed.





- Expanded Upper Extremities Exam


  ** Left


Elbow exam: full ROM, normal inspection


Forearm Wrist exam: full ROM, laceration


Neuro motor exam: finger 2-5 abduction intact, thumb abduction, thumb IP flexion

intact, thumb opposition intact, wrist extension intact


Neurosensory exam: median nerve intact, radial nerve intact, ulnar nerve intact


Vascular exam: radial pulse





- Neurological Exam


Neurological exam: Alert, Oriented x3





- Psychiatric Exam


Psychiatric exam: Normal Affect, Normal Mood





- Skin


Skin Exam: Warm


Additional comments: 





4cm x 2.5 cm wound to dorsum of hand over 1st MC. appears spongy like granuloma.

Most of this is dry, there is one area proximally that is draining serous fluid.

No pus. No palpable abscess. Not able to express any pus. No surrounding eryt

hema, there is a marker line noted, but no erythema at this time, suggesting 

improvement since admission





Results





- Vital Signs


Recent Vital Signs: 


                                Last Vital Signs











Temp  98.6 F   11/12/18 15:30


 


Pulse  71   11/12/18 15:30


 


Resp  20   11/12/18 15:30


 


BP  126/74   11/12/18 15:30


 


Pulse Ox  96   11/12/18 15:30














- Labs


Result Diagrams: 


                                 11/12/18 06:43





                                 11/12/18 06:43


Labs: 


                         Laboratory Results - last 24 hr











  11/10/18 11/12/18 11/12/18





  20:03 06:43 06:43


 


WBC   8.3 


 


RBC   4.70 


 


Hgb   14.5 


 


Hct   41.8 


 


MCV   88.9 


 


MCH   30.9 


 


MCHC   34.8 


 


RDW   12.5 


 


Plt Count   258 


 


MPV   8.0 


 


Neut % (Auto)   64.2 


 


Lymph % (Auto)   23.3 


 


Mono % (Auto)   8.1 


 


Eos % (Auto)   3.4 


 


Baso % (Auto)   1.0 


 


Neut # (Auto)   5.3 


 


Lymph # (Auto)   1.9 


 


Mono # (Auto)   0.7 


 


Eos # (Auto)   0.3 


 


Baso # (Auto)   0.1 


 


Sodium    137


 


Potassium    3.7


 


Chloride    102


 


Carbon Dioxide    26


 


Anion Gap    13


 


BUN    14


 


Creatinine    0.9


 


Est GFR ( Amer)    > 60


 


Est GFR (Non-Af Amer)    > 60


 


Random Glucose    100


 


Calcium    8.5 L


 


Total Bilirubin    0.6


 


AST    24


 


ALT    27


 


Alkaline Phosphatase    75


 


Total Protein    6.9


 


Albumin    3.7


 


Globulin    3.1


 


Albumin/Globulin Ratio    1.2


 


Vancomycin Trough   


 


HIV 1&2 Antibody Screen  Negative  














  11/12/18





  06:43


 


WBC 


 


RBC 


 


Hgb 


 


Hct 


 


MCV 


 


MCH 


 


MCHC 


 


RDW 


 


Plt Count 


 


MPV 


 


Neut % (Auto) 


 


Lymph % (Auto) 


 


Mono % (Auto) 


 


Eos % (Auto) 


 


Baso % (Auto) 


 


Neut # (Auto) 


 


Lymph # (Auto) 


 


Mono # (Auto) 


 


Eos # (Auto) 


 


Baso # (Auto) 


 


Sodium 


 


Potassium 


 


Chloride 


 


Carbon Dioxide 


 


Anion Gap 


 


BUN 


 


Creatinine 


 


Est GFR ( Amer) 


 


Est GFR (Non-Af Amer) 


 


Random Glucose 


 


Calcium 


 


Total Bilirubin 


 


AST 


 


ALT 


 


Alkaline Phosphatase 


 


Total Protein 


 


Albumin 


 


Globulin 


 


Albumin/Globulin Ratio 


 


Vancomycin Trough  < 5.0 L


 


HIV 1&2 Antibody Screen 














- Impressions


Impression: 





atient Name / ID : ROCIO RONDON  / [id number]


Exam Date : 11/10/2018 20:01:55 ( Approved )


Study Comment : 


Sex / Age : M  / 43Y





Creator : Medardo Olson MD


Dictator : Medardo Olson MD


 : 


Approver : Medardo Olson MD


Approver2 : 





Report Date : 11/11/2018 11:40:46


My Comment : 


*********************************************************

**************************





Date of service: 





11/10/2018





PROCEDURE:  CT of the left hand 





HISTORY:


Left Hand Cellulitis





COMPARISON:


None available.





TECHNIQUE:


Contiguous axial images of the le left hand hip were obtained following 

intravenous injection of approximately 100 cc Visipaque 320.  Coronal and 

sagittal reformats were generated.





Radiation dose:





Total exam DLP = 279.11 mGy-cm.





This CT exam was performed using one or more of the following dose reduction 

techniques: Automated exposure control, adjustment of the mA and/or kV according

to patient size, and/or use of iterative reconstruction technique.





FINDINGS:





BONES:


The current study reveals no evidence of acute displaced fracture nor 

dislocation.  The osseous structures appear intact.  No obvious cortical 

destructive changes seen at this time to suggest osteomyelitis however consider 

follow-up MRI if early osteomyelitis suspected clinically as this cannot be 

completely excluded based on the current study.





No evidence of significant osteoarthritis. 





SOFT TISSUES:


There is a long somewhat rectangular/elliptical shaped hypodense fluid 

collection within the lateral (radial aspect) soft tissues of the left hand 

extending from the distal aspect of the 1st metacarpal proximally to the level 

of the triquetrum.  This collection measures approximately 3.8 x 1.39 x 0.74 cm.

 Collection extends to the lateral volar skin surface with possible draining 

site best seen on axial series 3 image number 128 through 132.  There is 

surrounding infiltration changes in the adjacent subcutaneous tissues and 

musculature consistent with cellulitis.. 





No evidence of subcutaneous emphysema 





IMPRESSION:


Findings are consistent with a small elliptical shaped abscess collection with 

surrounding is infiltration changes consistent with cellulitis in the soft 

tissues of the lateral hand (radial aspect) at the level of the 1st metacarpal 

extending from distal metacarpal proximally to the level of the triquetrum..  No

evidence of subcutaneous emphysema.  No definitive bony destructive changes 

suggest osteomyelitis at this time however early osteomyelitis not excluded.  

Follow-up MRI may be prudent for further evaluation





These findings are concordant with preliminary radiology reading.




















Assessment & Plan


(1) Pyogenic granuloma of skin and subcutaneous tissue


Assessment and Plan: 


secondary to previous abscess to dorsum of radial aspect of hand


clinically doesn't appear there is any abscess to I&D, will follow up MRI


no clinical suspicion of septic arthritis, tenosynovitis, appears superficial 

and limited to skin/subcutaneous tissue


d/w DR. Thomson, will monitor daily at this time, and determine if there is any 

need for surgery/excision


cont IV antibiotics as per ID


saline wet to dry dressing


will follow


Status: Acute

## 2018-11-13 LAB
ALBUMIN SERPL-MCNC: 3.7 G/DL (ref 3.5–5)
ALBUMIN/GLOB SERPL: 1.1 {RATIO} (ref 1–2.1)
ALT SERPL-CCNC: 26 U/L (ref 21–72)
AST SERPL-CCNC: 29 U/L (ref 17–59)
BASOPHILS # BLD AUTO: 0.1 K/UL (ref 0–0.2)
BASOPHILS NFR BLD: 1.3 % (ref 0–2)
BUN SERPL-MCNC: 13 MG/DL (ref 9–20)
CALCIUM SERPL-MCNC: 8.9 MG/DL (ref 8.6–10.4)
EOSINOPHIL # BLD AUTO: 0.5 K/UL (ref 0–0.7)
EOSINOPHIL NFR BLD: 6.2 % (ref 0–4)
ERYTHROCYTE [DISTWIDTH] IN BLOOD BY AUTOMATED COUNT: 12.4 % (ref 11.5–14.5)
GFR NON-AFRICAN AMERICAN: > 60
HGB BLD-MCNC: 14.6 G/DL (ref 12–18)
LYMPHOCYTES # BLD AUTO: 2.1 K/UL (ref 1–4.3)
LYMPHOCYTES NFR BLD AUTO: 28.5 % (ref 20–40)
MCH RBC QN AUTO: 30.5 PG (ref 27–31)
MCHC RBC AUTO-ENTMCNC: 34.5 G/DL (ref 33–37)
MCV RBC AUTO: 88.4 FL (ref 80–94)
MONOCYTES # BLD: 0.7 K/UL (ref 0–0.8)
MONOCYTES NFR BLD: 9.2 % (ref 0–10)
NEUTROPHILS # BLD: 4 K/UL (ref 1.8–7)
NEUTROPHILS NFR BLD AUTO: 54.8 % (ref 50–75)
NRBC BLD AUTO-RTO: 0 % (ref 0–2)
PLATELET # BLD: 271 K/UL (ref 130–400)
PMV BLD AUTO: 8 FL (ref 7.2–11.7)
RBC # BLD AUTO: 4.78 MIL/UL (ref 4.4–5.9)
WBC # BLD AUTO: 7.3 K/UL (ref 4.8–10.8)

## 2018-11-13 RX ADMIN — TAZOBACTAM SODIUM AND PIPERACILLIN SODIUM SCH MLS/HR: 375; 3 INJECTION, SOLUTION INTRAVENOUS at 04:11

## 2018-11-13 RX ADMIN — TAZOBACTAM SODIUM AND PIPERACILLIN SODIUM SCH MLS/HR: 375; 3 INJECTION, SOLUTION INTRAVENOUS at 17:35

## 2018-11-13 RX ADMIN — TAZOBACTAM SODIUM AND PIPERACILLIN SODIUM SCH MLS/HR: 375; 3 INJECTION, SOLUTION INTRAVENOUS at 10:14

## 2018-11-13 RX ADMIN — TAZOBACTAM SODIUM AND PIPERACILLIN SODIUM SCH MLS/HR: 375; 3 INJECTION, SOLUTION INTRAVENOUS at 23:00

## 2018-11-13 RX ADMIN — VANCOMYCIN HYDROCHLORIDE SCH MLS/HR: 1 INJECTION, POWDER, LYOPHILIZED, FOR SOLUTION INTRAVENOUS at 11:38

## 2018-11-13 NOTE — CP.PCM.PN
Subjective





- Date & Time of Evaluation


Date of Evaluation: 11/13/18


Time of Evaluation: 08:00





- Subjective


Subjective: 





await MRI 





Objective





- Vital Signs/Intake and Output


Vital Signs (last 24 hours): 


                                        











Temp Pulse Resp BP Pulse Ox


 


 98.3 F   73   20   126/83   96 


 


 11/13/18 07:53  11/13/18 07:53  11/13/18 07:53  11/13/18 07:53  11/13/18 07:53








Intake and Output: 


                                        











 11/13/18 11/13/18





 06:59 18:59


 


Intake Total 2310 


 


Balance 2310 














- Medications


Medications: 


                               Current Medications





Acetaminophen (Tylenol 325mg Tab)  650 mg PO Q6 PRN


   PRN Reason: Pain or Fever


Piperacillin Sod/Tazobactam Sod (Zosyn 3.375 Gm Iv Premix)  3.375 gm in 50 mls @

100 mls/hr IVPB Q6H [facility]; Protocol


   Last Admin: 11/13/18 10:14 Dose:  100 mls/hr


Sodium Chloride (Sodium Chloride 0.9%)  1,000 mls @ 75 mls/hr IV .[id number] [facility]


   Last Admin: 11/13/18 05:34 Dose:  75 mls/hr


Vancomycin/Sodium Chloride (Vancomycin 1 Gm/Ns 200 Ml)  1 gm in 200 mls @ 166.6 

mls/hr IVPB Q12H [facility]; Protocol


   Stop: 11/17/18 12:31


   Last Admin: 11/13/18 11:38 Dose:  166.6 mls/hr











- Labs


Labs: 


                                        





                                 11/13/18 06:53 





                                 11/13/18 06:53 





                                        











PT  12.7 SECONDS (9.7-12.2)  H  11/10/18  16:43    


 


INR  1.2   11/10/18  16:43    


 


APTT  33 SECONDS (21-34)   11/10/18  16:43    














- Constitutional


Appears: Non-toxic, Chronically Ill





- Head Exam


Head Exam: NORMOCEPHALIC





- Eye Exam


Eye Exam: absent: Scleral icterus





- ENT Exam


ENT Exam: Mucous Membranes Dry





- Neck Exam


Neck Exam: absent: Lymphadenopathy





- Respiratory Exam


Respiratory Exam: Decreased Breath Sounds





- Cardiovascular Exam


Cardiovascular Exam: REGULAR RHYTHM





- GI/Abdominal Exam


GI & Abdominal Exam: Distended, Soft





Assessment and Plan


(1) Cellulitis of hand, left


Status: Acute   





- Assessment and Plan (Free Text)


Assessment: 





4cm x 2.5 cm wound to dorsum of hand over 1st MC. appears spongy like granuloma.

Most of this is dry, there is one area proximally that is draining serous fluid.

No pus. No palpable abscess. Not able to express any pus. No surrounding 

erythema, there is a marker line noted, but no erythema at this time, suggesting

improvement since admission

## 2018-11-13 NOTE — CP.PCM.PN
Subjective





- Date & Time of Evaluation


Date of Evaluation: 11/13/18


Time of Evaluation: 08:23





- Subjective


Subjective: 


Patient seen and examined with Dr. Thomson at bedside comfortable. Pain is well 

controlled. Offers no new complaints. Denies CP/SOB/fever/HA.








Objective





- Vital Signs/Intake and Output


Vital Signs (last 24 hours): 


                                        











Temp Pulse Resp BP Pulse Ox


 


 98.3 F   73   20   126/83   96 


 


 11/13/18 07:53  11/13/18 07:53  11/13/18 07:53  11/13/18 07:53  11/13/18 07:53








Intake and Output: 


                                        











 11/13/18 11/13/18





 06:59 18:59


 


Intake Total 2310 


 


Balance 2310 














- Medications


Medications: 


                               Current Medications





Acetaminophen (Tylenol 325mg Tab)  650 mg PO Q6 PRN


   PRN Reason: Pain or Fever


Piperacillin Sod/Tazobactam Sod (Zosyn 3.375 Gm Iv Premix)  3.375 gm in 50 mls @

100 mls/hr IVPB Q6H [facility]; Protocol


   Last Admin: 11/13/18 04:11 Dose:  100 mls/hr


Sodium Chloride (Sodium Chloride 0.9%)  1,000 mls @ 75 mls/hr IV .[id number] [facility]


   Last Admin: 11/13/18 05:34 Dose:  75 mls/hr


Vancomycin/Sodium Chloride (Vancomycin 1 Gm/Ns 200 Ml)  1 gm in 200 mls @ 166.6 

mls/hr IVPB Q12H [facility]; Protocol


   Stop: 11/17/18 12:31


   Last Admin: 11/12/18 23:32 Dose:  166.6 mls/hr











- Labs


Labs: 


                                        





                                 11/13/18 06:53 





                                 11/13/18 06:53 





                                        











PT  12.7 SECONDS (9.7-12.2)  H  11/10/18  16:43    


 


INR  1.2   11/10/18  16:43    


 


APTT  33 SECONDS (21-34)   11/10/18  16:43    














- Extremities Exam


Additional comments: 


L hand: Dressing intact with mild serous drainage


4cm x 2.5 cm wound to dorsum of hand over 1st MC with mild serous drainage. No 

pus. No fluctuance. No  erythema


sensation and motor intact MN/UN/TN


cap refill 2 sec all fingers





Assessment and Plan


(1) Pyogenic granuloma of skin and subcutaneous tissue


Assessment & Plan: 


-No acute orthopedic surgical intervention at this time


-cont IV antibiotics as per ID


-betadine soaks daily


-orthopedically stable for discharge


-may f/u in office within 2 weeks of discharge


-above d/w Dr. Thomson in agreement


Status: Acute

## 2018-11-13 NOTE — MRI
MRI left hand 



HISTORY:

Soft tissue abscess. 



COMPARISON:

CT scan dated 11/10/2018 



TECHNIQUE:

Multi-echo multiplanar sequences were performed through the left hand 

without the use of intravenous contrast. 



Findings:



Again identified is a moderate-sized abscess collection seen within 

the radial sided soft tissues at the radial aspect of the 1st 

metacarpal measuring up to 2.3 x 1.0 x 2.7 centimeters.  Associated 

prominent reticulation and edema.  The edema appears to abut the 

radial sided cortex of the 1st metacarpal bone; however, there is no 

gross signal abnormality within the 1st metacarpal to suggest an 

acute osteomyelitis. 



8 millimeter subchondral cyst formation noted within the head of the 

3rd metacarpal bone. 



Remainder of the visualized osseous structures appear grossly 

preserved. 



Visualized extensor tendons are grossly preserved. 



Visualized flexor tendons are grossly preserved. 



Impression: 



Again identified is a moderate-sized abscess collection seen within 

the radial sided soft tissues at the radial aspect of the 1st 

metacarpal measuring up to 2.3 x 1.0 x 2.7 centimeters.  Associated 

prominent reticulation and edema.  The edema appears to abut the 

radial sided cortex of the 1st metacarpal bone; however, there is no 

gross signal abnormality within the 1st metacarpal to suggest an 

acute osteomyelitis. 



Continued interval follow-up and/or post treatment interval follow-up 

may be helpful if clinically indicated to exclude a developing acute 

and or early acute osteomyelitis.

## 2018-11-13 NOTE — CP.PCM.PN
<Corina Ocampo - Last Filed: 11/13/18 14:02>





Subjective





- Date & Time of Evaluation


Date of Evaluation: 11/13/18


Time of Evaluation: 11:40





- Subjective


Subjective: 


PGY-1 Medicine Progress Note for Dr. Lee





Patient was seen and examined today at bedside in no acute distress. Nurse 

reports no overnight events. Patient has no new complaints. He is still unable 

to fully use his L hand since the bandages get in the way, but he feels like it 

is less swollen and his care is going in the right direction. Denies fever, 

chills, numbness, tingling, chest pain, shortness of breath, difficulty 

urinating or having BM.





Objective





- Vital Signs/Intake and Output


Vital Signs (last 24 hours): 


                                        











Temp Pulse Resp BP Pulse Ox


 


 98.3 F   73   20   126/83   96 


 


 11/13/18 07:53  11/13/18 07:53  11/13/18 07:53  11/13/18 07:53  11/13/18 07:53








Intake and Output: 


                                        











 11/13/18 11/13/18





 06:59 18:59


 


Intake Total 2310 


 


Balance 2310 














- Medications


Medications: 


                               Current Medications





Acetaminophen (Tylenol 325mg Tab)  650 mg PO Q6 PRN


   PRN Reason: Pain or Fever


Piperacillin Sod/Tazobactam Sod (Zosyn 3.375 Gm Iv Premix)  3.375 gm in 50 mls @

100 mls/hr IVPB Q6H PUNEET; Protocol


   Last Admin: 11/13/18 10:14 Dose:  100 mls/hr


Sodium Chloride (Sodium Chloride 0.9%)  1,000 mls @ 75 mls/hr IV .[id number] PUNEET


   Last Admin: 11/13/18 05:34 Dose:  75 mls/hr


Vancomycin/Sodium Chloride (Vancomycin 1 Gm/Ns 200 Ml)  1 gm in 200 mls @ 166.6 

mls/hr IVPB Q12H PUNEET; Protocol


   Stop: 11/17/18 12:31


   Last Admin: 11/13/18 11:38 Dose:  166.6 mls/hr











- Labs


Labs: 


                                        





                                 11/13/18 06:53 





                                 11/13/18 06:53 





                                        











PT  12.7 SECONDS (9.7-12.2)  H  11/10/18  16:43    


 


INR  1.2   11/10/18  16:43    


 


APTT  33 SECONDS (21-34)   11/10/18  16:43    














- Constitutional


Appears: Well, No Acute Distress





- Head Exam


Head Exam: ATRAUMATIC, NORMOCEPHALIC





- Eye Exam


Eye Exam: EOMI, Normal appearance





- ENT Exam


ENT Exam: Mucous Membranes Moist





- Respiratory Exam


Respiratory Exam: Clear to Ausculation Bilateral, NORMAL BREATHING PATTERN.  

absent: Rales, Rhonchi, Wheezes





- Cardiovascular Exam


Cardiovascular Exam: REGULAR RHYTHM, +S1, +S2.  absent: Gallop, Rubs, Murmur





- GI/Abdominal Exam


GI & Abdominal Exam: Soft, Normal Bowel Sounds.  absent: Tenderness





- Extremities Exam


Extremities Exam: Normal Capillary Refill.  absent: Calf Tenderness, Pedal Edema


Additional comments: 


IV access in R forearm


peripheral pulses palpable bilaterally (radial, ulnar, PT)





- Neurological Exam


Neurological Exam: Alert, Awake, Oriented x3





- Psychiatric Exam


Psychiatric exam: Normal Affect, Normal Mood





- Skin


Skin Exam: Normal Color, Warm


Additional comments: 


Left hand: Edema with bloody drainage with tenderness along radial aspect of 

left thumb at the level of metacarpal. Sensation of digits on left hand intact. 

Able to make a fist with left hand. Capillary refill <2 seconds. Wrapped with 

4x4 and gauze. Dressings replaced daily, c/d/i


Left axilla: small papular erythematous fluctuant region that are mildly tender 

to palpation. No axillary lymphadenopathy.





Assessment and Plan





- Assessment and Plan (Free Text)


Assessment: 


43 year old male with past medical history of nephrolithiasis admitted for 

evaluation and treatment of left hand/thumb cellulitis.


Plan: 


Left Hand/Thumb Cellulitis


- Remains afebrile


- White count 7.3


- Left Hand X-ray:  There is localized enlarged heterogeneous appearance of the 

soft tissues adjacent to the radial margin of the 1st metacarpal with presumed 

involvement of the thenar eminence and extending to the level of the triquetrum.

Findings may represent a cellulitis and possible abscess. Follow up additional 

imaging such as a CT with IV contrast is recommended. 


- Left Hand CT (11/10/18): Findings are consistent with a small elliptical 

shaped abscess collection with surrounding is infiltration changes consistent 

with cellulitis in the soft tissues of the lateral hand (radial aspect) at the 

level of the 1st metacarpal extending from distal metacarpal proximally to the 

level of the triquetrum..  No evidence of subcutaneous emphysema.  No definitive

bony destructive changes suggest osteomyelitis at this time however early 

osteomyelitis not excluded.  Follow-up MRI may be prudent for further evaluation


- f/u left hand MRI results 


- HIV negative 


- Hand Surgery Consulted (Dr. Thomson), help appreciated: no surgical intervention

at this time, may f/u in office within 2 weeks of d/c


- ID Consulted (Dr. Toro), help appreciated 


- In ED patient received Clindamycin, Zosyn, Vancomycin


- Wound Culture (11/10/18): positive for MRSA


- Blood Cultures no growth x48 hours 


- Meds:


   Vancomycin 1 gram IV Q12 (vanc trough (11/13): 5.9. next trough 11/15)


   Zosyn 3.375 Q6H IV 


   Tylenol 650mg PO Q6 PRN 


   Tdap IM Given Once 





Proph


DVT: Heparin 5000u sc q8, SCDs


GI: not indicated


Diet: Regular





d/w Dr. Rosa Ocampo PGY-1





<Farhat Lee H - Last Filed: 11/14/18 07:19>





Objective





- Vital Signs/Intake and Output


Vital Signs (last 24 hours): 


                                        











Temp Pulse Resp BP Pulse Ox


 


 98.6 F   65   20   121/80   98 


 


 11/14/18 00:00  11/14/18 00:00  11/14/18 00:00  11/14/18 00:00  11/14/18 00:00











- Medications


Medications: 


                               Current Medications





Acetaminophen (Tylenol 325mg Tab)  650 mg PO Q6 PRN


   PRN Reason: Pain or Fever


Heparin Sodium (Porcine) (Heparin)  5,000 units SC Q8 PUNEET


   Last Admin: 11/13/18 21:35 Dose:  5,000 units


Piperacillin Sod/Tazobactam Sod (Zosyn 3.375 Gm Iv Premix)  3.375 gm in 50 mls @

100 mls/hr IVPB Q6H PUNEET; Protocol


   Last Admin: 11/14/18 04:22 Dose:  100 mls/hr


Vancomycin/Sodium Chloride (Vancomycin 1 Gm/Ns 200 Ml)  1 gm in 200 mls @ 166.6 

mls/hr IVPB Q12H PUNEET; Protocol


   Stop: 11/17/18 12:31


   Last Admin: 11/14/18 00:03 Dose:  166.6 mls/hr











- Labs


Labs: 


                                        





                                 11/13/18 06:53 





                                 11/13/18 06:53 





                                        











PT  12.7 SECONDS (9.7-12.2)  H  11/10/18  16:43    


 


INR  1.2   11/10/18  16:43    


 


APTT  33 SECONDS (21-34)   11/10/18  16:43    














Attending/Attestation





- Attestation


I have personally seen and examined this patient.: Yes


I have fully participated in the care of the patient.: Yes


I have reviewed all pertinent clinical information, including history, physical 

exam and plan: Yes


Notes (Text): 





11/14/18 07:15


Medical attending: Patient was seen and examined by me. Agree with the above 

note by the resident


The patient was not in acute distress, surgery is not planning on interventions.




The patient's hand was unwrapped and I looked at it myself. 


Currently is on IV abx, there is a positive culture for MSSA at this moment


Patient showed us photos of before all of this started and it maybe that it was 

a folliculitis that unfourtunately became complicated





Farhat Lee

## 2018-11-14 LAB
ALBUMIN SERPL-MCNC: 4 G/DL (ref 3.5–5)
ALBUMIN/GLOB SERPL: 1.2 {RATIO} (ref 1–2.1)
ALT SERPL-CCNC: 31 U/L (ref 21–72)
AST SERPL-CCNC: 29 U/L (ref 17–59)
BASOPHILS # BLD AUTO: 0.1 K/UL (ref 0–0.2)
BASOPHILS NFR BLD: 1.3 % (ref 0–2)
BUN SERPL-MCNC: 16 MG/DL (ref 9–20)
CALCIUM SERPL-MCNC: 9.1 MG/DL (ref 8.6–10.4)
EOSINOPHIL # BLD AUTO: 0.4 K/UL (ref 0–0.7)
EOSINOPHIL NFR BLD: 5.6 % (ref 0–4)
ERYTHROCYTE [DISTWIDTH] IN BLOOD BY AUTOMATED COUNT: 12.7 % (ref 11.5–14.5)
GFR NON-AFRICAN AMERICAN: > 60
HGB BLD-MCNC: 15.3 G/DL (ref 12–18)
LYMPHOCYTES # BLD AUTO: 2.2 K/UL (ref 1–4.3)
LYMPHOCYTES NFR BLD AUTO: 29.5 % (ref 20–40)
MCH RBC QN AUTO: 30.6 PG (ref 27–31)
MCHC RBC AUTO-ENTMCNC: 34.6 G/DL (ref 33–37)
MCV RBC AUTO: 88.5 FL (ref 80–94)
MONOCYTES # BLD: 0.6 K/UL (ref 0–0.8)
MONOCYTES NFR BLD: 8.5 % (ref 0–10)
NEUTROPHILS # BLD: 4.2 K/UL (ref 1.8–7)
NEUTROPHILS NFR BLD AUTO: 55.1 % (ref 50–75)
NRBC BLD AUTO-RTO: 0.1 % (ref 0–2)
PLATELET # BLD: 285 K/UL (ref 130–400)
PMV BLD AUTO: 7.8 FL (ref 7.2–11.7)
RBC # BLD AUTO: 4.99 MIL/UL (ref 4.4–5.9)
WBC # BLD AUTO: 7.6 K/UL (ref 4.8–10.8)

## 2018-11-14 RX ADMIN — TAZOBACTAM SODIUM AND PIPERACILLIN SODIUM SCH MLS/HR: 375; 3 INJECTION, SOLUTION INTRAVENOUS at 11:15

## 2018-11-14 RX ADMIN — TAZOBACTAM SODIUM AND PIPERACILLIN SODIUM SCH MLS/HR: 375; 3 INJECTION, SOLUTION INTRAVENOUS at 04:22

## 2018-11-14 RX ADMIN — TAZOBACTAM SODIUM AND PIPERACILLIN SODIUM SCH MLS/HR: 375; 3 INJECTION, SOLUTION INTRAVENOUS at 16:33

## 2018-11-14 RX ADMIN — VANCOMYCIN HYDROCHLORIDE SCH MLS/HR: 1 INJECTION, POWDER, LYOPHILIZED, FOR SOLUTION INTRAVENOUS at 12:30

## 2018-11-14 RX ADMIN — TAZOBACTAM SODIUM AND PIPERACILLIN SODIUM SCH MLS/HR: 375; 3 INJECTION, SOLUTION INTRAVENOUS at 22:39

## 2018-11-14 RX ADMIN — VANCOMYCIN HYDROCHLORIDE SCH MLS/HR: 1 INJECTION, POWDER, LYOPHILIZED, FOR SOLUTION INTRAVENOUS at 00:03

## 2018-11-14 RX ADMIN — VANCOMYCIN HYDROCHLORIDE SCH MLS/HR: 1 INJECTION, POWDER, LYOPHILIZED, FOR SOLUTION INTRAVENOUS at 21:43

## 2018-11-14 NOTE — CP.PCM.PN
<Ayo Velez - Last Filed: 11/14/18 18:59>





Subjective





- Date & Time of Evaluation


Date of Evaluation: 11/14/18


Time of Evaluation: 09:44





- Subjective


Subjective: 





Progress note for Hospitalist service 





Patient seen and examined at bedside. He states that his current pain is 

improved from before. He denies fevers, chills, chest pain, shortness of breath,

abdominal pain, nausea, vomiting, diarrhea, leg pain. He states he is able to 

make a fist with his hand. 





Objective





- Vital Signs/Intake and Output


Vital Signs (last 24 hours): 


                                        











Temp Pulse Resp BP Pulse Ox


 


 98.2 F   71   20   115/74   95 


 


 11/14/18 09:42  11/14/18 09:42  11/14/18 09:42  11/14/18 09:42  11/14/18 09:42








Intake and Output: 


                                        











 11/14/18 11/14/18





 06:59 18:59


 


Intake Total  400


 


Output Total  400


 


Balance  0














- Medications


Medications: 


                               Current Medications





Acetaminophen (Tylenol 325mg Tab)  650 mg PO Q6 PRN


   PRN Reason: Pain or Fever


Heparin Sodium (Porcine) (Heparin)  5,000 units SC Q8 PUNEET


   Last Admin: 11/14/18 07:00 Dose:  5,000 units


Piperacillin Sod/Tazobactam Sod (Zosyn 3.375 Gm Iv Premix)  3.375 gm in 50 mls @

100 mls/hr IVPB Q6H PUNEET; Protocol


   Last Admin: 11/14/18 04:22 Dose:  100 mls/hr


Vancomycin/Sodium Chloride (Vancomycin 1 Gm/Ns 200 Ml)  1 gm in 200 mls @ 166.6 

mls/hr IVPB Q12H PUNEET; Protocol


   Stop: 11/17/18 12:31


   Last Admin: 11/14/18 00:03 Dose:  166.6 mls/hr











- Labs


Labs: 


                                        





                                 11/14/18 07:07 





                                 11/14/18 07:07 





                                        











PT  12.7 SECONDS (9.7-12.2)  H  11/10/18  16:43    


 


INR  1.2   11/10/18  16:43    


 


APTT  33 SECONDS (21-34)   11/10/18  16:43    














- Constitutional


Appears: Well, No Acute Distress





- Head Exam


Head Exam: ATRAUMATIC, NORMOCEPHALIC





- Eye Exam


Eye Exam: EOMI





- ENT Exam


ENT Exam: Mucous Membranes Moist





- Neck Exam


Neck Exam: Full ROM





- Respiratory Exam


Respiratory Exam: Clear to Ausculation Bilateral, NORMAL BREATHING PATTERN.  

absent: Rales, Rhonchi, Wheezes, Respiratory Distress, Stridor





- Cardiovascular Exam


Cardiovascular Exam: REGULAR RHYTHM, +S1, +S2.  absent: Gallop, Rubs, Murmur





- GI/Abdominal Exam


GI & Abdominal Exam: Soft, Normal Bowel Sounds.  absent: Distended, Firm, 

Guarding, Rigid, Tenderness, Organomegaly





- Extremities Exam


Extremities Exam: absent: Calf Tenderness, Pedal Edema


Additional comments: 





Left hand: erythema, edema with mild tenderness along radial aspect of left 

thumb at the level of 1st metacarpal. Sensation of digit on left hand intact. 

Able to make a fist. Capillary refill <2seconds. Wrapped with gauze. Dressing 

replaced daily, clean dry intact. 


Left axilla: Small papular erythematous fluctuant region that is mildly tender 

to palpation. no lymphadenopathy. 





- Neurological Exam


Neurological Exam: Alert, Awake, Oriented x3





- Psychiatric Exam


Psychiatric exam: Normal Affect, Normal Mood





- Skin


Skin Exam: Dry, Intact, Warm





Assessment and Plan





- Assessment and Plan (Free Text)


Plan: 





Assessment: 


43 year old male with past medical history of nephrolithiasis admitted for 

evaluation and treatment of left hand/thumb cellulitis.





Plan: 


Left Hand/Thumb Cellulitis


- Remains afebrile


- White count 7.6


- Left Hand X-ray:  There is localized enlarged heterogeneous appearance of the 

soft tissues adjacent to the radial margin of the 1st metacarpal with presumed 

involvement of the thenar eminence and extending to the level of the triquetrum.

Findings may represent a cellulitis and possible abscess. Follow up additional 

imaging such as a CT with IV contrast is recommended. 


- Left Hand CT (11/10/18): Findings are consistent with a small elliptical 

shaped abscess collection with surrounding is infiltration changes consistent 

with cellulitis in the soft tissues of the lateral hand (radial aspect) at the 

level of the 1st metacarpal extending from distal metacarpal proximally to the 

level of the triquetrum..  No evidence of subcutaneous emphysema.  No definitive

bony destructive changes suggest osteomyelitis at this time however early 

osteomyelitis not excluded.  Follow-up MRI may be prudent for further evaluation


- Left hand MRI: Again identified is a moderate-sized abscess collection seen 

within the radial sided soft tissues at the radial aspect of the 1st metacarpal 

measuring up to 2.3 x 1.0 x 2.7 centimeters.  Associated prominent reticulation 

and edema.  The edema appears to abut the radial sided cortex of the 1st 

metacarpal bone; however, there is no gross signal abnormality within the 1st 

metacarpal to suggest an acute osteomyelitis. 


Continued interval follow-up and/or post treatment interval follow-up may be 

helpful if clinically indicated to exclude a developing acute and or early acute

osteomyelitis.


- HIV negative 


- Hand Surgery Consulted (Dr. Thomson), help appreciated: no surgical intervention

at this time, may f/u in office within 2 weeks of d/c


- ID Consulted (Dr. Toro), help appreciated 


- In ED patient received Clindamycin, Zosyn, Vancomycin


- Wound Culture (11/10/18): positive for MRSA


- Blood Cultures no growth x48 hours 


- Meds:


   Vancomycin 1 gram IV Q12 (vanc trough (11/13): 5.9. next trough 11/15)


   Zosyn 3.375 Q6H IV 


   Tylenol 650mg PO Q6 PRN 


   Tdap IM Given Once 





Proph


DVT: Heparin 5000u sc q8, SCDs


GI: not indicated


Diet: Regular





Case discussed with Dr. Rosa Velez, PGY1





<Farhat Lee H - Last Filed: 11/15/18 06:51>





Objective





- Vital Signs/Intake and Output


Vital Signs (last 24 hours): 


                                        











Temp Pulse Resp BP Pulse Ox


 


 98.9 F   65   20   125/84   95 


 


 11/15/18 00:00  11/15/18 00:00  11/15/18 00:00  11/15/18 00:00  11/15/18 00:00








Intake and Output: 


                                        











 11/14/18 11/15/18





 18:59 06:59


 


Intake Total 400 450


 


Output Total 400 


 


Balance 0 450














- Medications


Medications: 


                               Current Medications





Acetaminophen (Tylenol 325mg Tab)  650 mg PO Q6 PRN


   PRN Reason: Pain or Fever


Heparin Sodium (Porcine) (Heparin)  5,000 units SC Q8 PUNEET


   Last Admin: 11/15/18 05:32 Dose:  5,000 units


Piperacillin Sod/Tazobactam Sod (Zosyn 3.375 Gm Iv Premix)  3.375 gm in 50 mls @

100 mls/hr IVPB Q6H PUNEET; Protocol


   Last Admin: 11/15/18 05:13 Dose:  100 mls/hr


Vancomycin/Sodium Chloride (Vancomycin 1 Gm/Ns 200 Ml)  1 gm in 200 mls @ 

133.333 mls/hr IVPB Q8H PUNEET; Protocol


   Last Admin: 11/15/18 03:50 Dose:  133.333 mls/hr











- Labs


Labs: 


                                        





                                 11/14/18 07:07 





                                 11/14/18 07:07 





                                        











PT  12.7 SECONDS (9.7-12.2)  H  11/10/18  16:43    


 


INR  1.2   11/10/18  16:43    


 


APTT  33 SECONDS (21-34)   11/10/18  16:43    














Attending/Attestation





- Attestation


I have personally seen and examined this patient.: Yes


I have fully participated in the care of the patient.: Yes


I have reviewed all pertinent clinical information, including history, physical 

exam and plan: Yes

## 2018-11-15 LAB
ALBUMIN SERPL-MCNC: 3.9 G/DL (ref 3.5–5)
ALBUMIN/GLOB SERPL: 1.2 {RATIO} (ref 1–2.1)
ALT SERPL-CCNC: 31 U/L (ref 21–72)
AST SERPL-CCNC: 24 U/L (ref 17–59)
BASOPHILS # BLD AUTO: 0.1 K/UL (ref 0–0.2)
BASOPHILS NFR BLD: 1.2 % (ref 0–2)
BUN SERPL-MCNC: 17 MG/DL (ref 9–20)
CALCIUM SERPL-MCNC: 9 MG/DL (ref 8.6–10.4)
EOSINOPHIL # BLD AUTO: 0.3 K/UL (ref 0–0.7)
EOSINOPHIL NFR BLD: 4.8 % (ref 0–4)
ERYTHROCYTE [DISTWIDTH] IN BLOOD BY AUTOMATED COUNT: 12.7 % (ref 11.5–14.5)
GFR NON-AFRICAN AMERICAN: > 60
HGB BLD-MCNC: 15.2 G/DL (ref 12–18)
LYMPHOCYTES # BLD AUTO: 1.9 K/UL (ref 1–4.3)
LYMPHOCYTES NFR BLD AUTO: 27.4 % (ref 20–40)
MCH RBC QN AUTO: 30.6 PG (ref 27–31)
MCHC RBC AUTO-ENTMCNC: 34.5 G/DL (ref 33–37)
MCV RBC AUTO: 88.7 FL (ref 80–94)
MONOCYTES # BLD: 0.6 K/UL (ref 0–0.8)
MONOCYTES NFR BLD: 8 % (ref 0–10)
NEUTROPHILS # BLD: 4.2 K/UL (ref 1.8–7)
NEUTROPHILS NFR BLD AUTO: 58.6 % (ref 50–75)
NRBC BLD AUTO-RTO: 0 % (ref 0–2)
PLATELET # BLD: 299 K/UL (ref 130–400)
PMV BLD AUTO: 7.7 FL (ref 7.2–11.7)
RBC # BLD AUTO: 4.98 MIL/UL (ref 4.4–5.9)
WBC # BLD AUTO: 7.1 K/UL (ref 4.8–10.8)

## 2018-11-15 RX ADMIN — VANCOMYCIN HYDROCHLORIDE SCH MLS/HR: 1 INJECTION, POWDER, LYOPHILIZED, FOR SOLUTION INTRAVENOUS at 11:55

## 2018-11-15 RX ADMIN — VANCOMYCIN HYDROCHLORIDE SCH MLS/HR: 1 INJECTION, POWDER, LYOPHILIZED, FOR SOLUTION INTRAVENOUS at 20:29

## 2018-11-15 RX ADMIN — VANCOMYCIN HYDROCHLORIDE SCH MLS/HR: 1 INJECTION, POWDER, LYOPHILIZED, FOR SOLUTION INTRAVENOUS at 03:50

## 2018-11-15 RX ADMIN — TAZOBACTAM SODIUM AND PIPERACILLIN SODIUM SCH MLS/HR: 375; 3 INJECTION, SOLUTION INTRAVENOUS at 22:26

## 2018-11-15 RX ADMIN — TAZOBACTAM SODIUM AND PIPERACILLIN SODIUM SCH MLS/HR: 375; 3 INJECTION, SOLUTION INTRAVENOUS at 11:11

## 2018-11-15 RX ADMIN — TAZOBACTAM SODIUM AND PIPERACILLIN SODIUM SCH MLS/HR: 375; 3 INJECTION, SOLUTION INTRAVENOUS at 05:13

## 2018-11-15 RX ADMIN — TAZOBACTAM SODIUM AND PIPERACILLIN SODIUM SCH MLS/HR: 375; 3 INJECTION, SOLUTION INTRAVENOUS at 16:39

## 2018-11-15 NOTE — CP.PCM.PN
<Ayo Velez - Last Filed: 11/15/18 17:44>





Subjective





- Date & Time of Evaluation


Date of Evaluation: 11/15/18


Time of Evaluation: 07:07





- Subjective


Subjective: 





Progress note for Hospitalist service 





Patient seen and examined at bedside. He states that his pain in his left hand 

is much improved. He denies fevers, chills, chest pain, shortness of breath, 

abdominal pain, nausea, vomiting, diarrhea, leg pain, dysuria, leg swelling. 





Objective





- Vital Signs/Intake and Output


Vital Signs (last 24 hours): 


                                        











Temp Pulse Resp BP Pulse Ox


 


 98.9 F   65   20   125/84   95 


 


 11/15/18 00:00  11/15/18 00:00  11/15/18 00:00  11/15/18 00:00  11/15/18 00:00








Intake and Output: 


                                        











 11/15/18 11/15/18





 06:59 18:59


 


Intake Total 450 


 


Balance 450 














- Medications


Medications: 


                               Current Medications





Acetaminophen (Tylenol 325mg Tab)  650 mg PO Q6 PRN


   PRN Reason: Pain or Fever


Heparin Sodium (Porcine) (Heparin)  5,000 units SC Q8 PUNEET


   Last Admin: 11/15/18 05:32 Dose:  5,000 units


Piperacillin Sod/Tazobactam Sod (Zosyn 3.375 Gm Iv Premix)  3.375 gm in 50 mls @

100 mls/hr IVPB Q6H PUNEET; Protocol


   Last Admin: 11/15/18 05:13 Dose:  100 mls/hr


Vancomycin/Sodium Chloride (Vancomycin 1 Gm/Ns 200 Ml)  1 gm in 200 mls @ 

133.333 mls/hr IVPB Q8H PUNEET; Protocol


   Last Admin: 11/15/18 03:50 Dose:  133.333 mls/hr











- Labs


Labs: 


                                        





                                 11/14/18 07:07 





                                 11/14/18 07:07 





                                        











PT  12.7 SECONDS (9.7-12.2)  H  11/10/18  16:43    


 


INR  1.2   11/10/18  16:43    


 


APTT  33 SECONDS (21-34)   11/10/18  16:43    














- Constitutional


Appears: Well, No Acute Distress





- Head Exam


Head Exam: ATRAUMATIC, NORMOCEPHALIC





- Eye Exam


Eye Exam: EOMI





- ENT Exam


ENT Exam: Mucous Membranes Moist





- Neck Exam


Neck Exam: Full ROM





- Respiratory Exam


Respiratory Exam: Clear to Ausculation Bilateral.  absent: Rales, Rhonchi, 

Wheezes, Respiratory Distress, Stridor





- Cardiovascular Exam


Cardiovascular Exam: REGULAR RHYTHM, +S1, +S2.  absent: Gallop, Rubs, Murmur





- GI/Abdominal Exam


GI & Abdominal Exam: Soft, Normal Bowel Sounds.  absent: Distended, Firm, 

Guarding, Rigid, Tenderness, Organomegaly





- Extremities Exam


Additional comments: 





Left hand: Improved erythema, edema with mild tenderness along radial aspect of 

left thumb at the level of 1st metacarpal. No active discharge noted.  Sensation

of digit on left hand intact. Able to make a fist. Capillary refill <2seconds. 

Dressing replaced daily, clean dry intact. 


Left axilla: Small papular erythematous fluctuant region that is mildly tender 

to palpation. no lymphadenopathy. 





- Neurological Exam


Neurological Exam: Alert, Awake, Oriented x3





Assessment and Plan





- Assessment and Plan (Free Text)


Plan: 





Assessment: 


43 year old male with past medical history of nephrolithiasis admitted for 

evaluation and treatment of left hand/thumb cellulitis.





Plan: 


Left Hand/Thumb Cellulitis


- Remains afebrile


- White count 7.1


- Left Hand X-ray:  There is localized enlarged heterogeneous appearance of the 

soft tissues adjacent to the radial margin of the 1st metacarpal with presumed 

involvement of the thenar eminence and extending to the level of the triquetrum.

Findings may represent a cellulitis and possible abscess. Follow up additional 

imaging such as a CT with IV contrast is recommended. 


- Left Hand CT (11/10/18): Findings are consistent with a small elliptical 

shaped abscess collection with surrounding is infiltration changes consistent 

with cellulitis in the soft tissues of the lateral hand (radial aspect) at the 

level of the 1st metacarpal extending from distal metacarpal proximally to the 

level of the triquetrum..  No evidence of subcutaneous emphysema.  No definitive

bony destructive changes suggest osteomyelitis at this time however early 

osteomyelitis not excluded.  Follow-up MRI may be prudent for further evaluation


- Left hand MRI: Again identified is a moderate-sized abscess collection seen 

within the radial sided soft tissues at the radial aspect of the 1st metacarpal 

measuring up to 2.3 x 1.0 x 2.7 centimeters.  Associated prominent reticulation 

and edema.  The edema appears to abut the radial sided cortex of the 1st 

metacarpal bone; however, there is no gross signal abnormality within the 1st 

metacarpal to suggest an acute osteomyelitis. 


Continued interval follow-up and/or post treatment interval follow-up may be 

helpful if clinically indicated to exclude a developing acute and or early acute

osteomyelitis.


- HIV negative 


- Hand Surgery Consulted (Dr. Thomson), help appreciated: betadine soaks, no enmanuel

gical intervention at this time, may f/u in office within 2 weeks of d/c


- ID Consulted (Dr. Toro), help appreciated 


- In ED patient received Clindamycin, Zosyn, Vancomycin


- Wound Culture (11/10/18): positive for MRSA


- Blood Cultures no growth x 5 days


- Meds:


   Vancomycin 1 gram IV Q12 (vanc trough (11/13): 5.9. vanc trough (11/15): 

13.1, next trough 11/17


   Zosyn 3.375 Q6H IV 


   Tylenol 650mg PO Q6 PRN 


   Tdap IM Given Once 





Proph


DVT: Heparin 5000u sc q8, SCDs


GI: not indicated


Diet: Regular





Case discussed with Dr. Rosa Velez, PGY1





<Farhat Lee H - Last Filed: 11/16/18 07:03>





Objective





- Vital Signs/Intake and Output


Vital Signs (last 24 hours): 


                                        











Temp Pulse Resp BP Pulse Ox


 


 98.2 F   63   20   127/81   98 


 


 11/15/18 16:00  11/15/18 16:00  11/15/18 16:00  11/15/18 16:00  11/15/18 16:00








Intake and Output: 


                                        











 11/15/18 11/16/18





 18:59 06:59


 


Intake Total 780 


 


Balance 780 














- Medications


Medications: 


                               Current Medications





Acetaminophen (Tylenol 325mg Tab)  650 mg PO Q6 PRN


   PRN Reason: Pain or Fever


Heparin Sodium (Porcine) (Heparin)  5,000 units SC Q8 PUNEET


   Last Admin: 11/15/18 14:26 Dose:  5,000 units


Piperacillin Sod/Tazobactam Sod (Zosyn 3.375 Gm Iv Premix)  3.375 gm in 50 mls @

100 mls/hr IVPB Q6H PUNEET; Protocol


   Last Admin: 11/15/18 16:39 Dose:  100 mls/hr


Vancomycin/Sodium Chloride (Vancomycin 1 Gm/Ns 200 Ml)  1 gm in 200 mls @ 

133.333 mls/hr IVPB Q8H PUNEET; Protocol


   Last Admin: 11/15/18 11:55 Dose:  133.333 mls/hr











- Labs


Labs: 


                                        





                                 11/15/18 07:32 





                                 11/15/18 07:32 





                                        











PT  12.7 SECONDS (9.7-12.2)  H  11/10/18  16:43    


 


INR  1.2   11/10/18  16:43    


 


APTT  33 SECONDS (21-34)   11/10/18  16:43    














Attending/Attestation





- Attestation


I have personally seen and examined this patient.: Yes


I have fully participated in the care of the patient.: Yes


I have reviewed all pertinent clinical information, including history, physical 

exam and plan: Yes


Notes (Text): 





11/16/18 06:55


Medical attending: Patient was seen and examined by me, reviewed the above note 

by the resident


The patient was not in any acute distress. He has had MRI of the hand and it 

does not show osteomylitis. There is a 2.3 x 1.0 x 2.7 centimeters area abbcess 


His hand is still covered with dressing. He is afebrile. On exam he was able to 

quickly move his thumb and close and open hand without pain. 





Farhat Lee

## 2018-11-16 VITALS — OXYGEN SATURATION: 96 % | SYSTOLIC BLOOD PRESSURE: 121 MMHG | DIASTOLIC BLOOD PRESSURE: 80 MMHG

## 2018-11-16 VITALS — TEMPERATURE: 98 F | HEART RATE: 75 BPM

## 2018-11-16 LAB
ALBUMIN SERPL-MCNC: 4 G/DL (ref 3.5–5)
ALBUMIN/GLOB SERPL: 1.2 {RATIO} (ref 1–2.1)
ALT SERPL-CCNC: 28 U/L (ref 21–72)
AST SERPL-CCNC: 26 U/L (ref 17–59)
BASOPHILS # BLD AUTO: 0.1 K/UL (ref 0–0.2)
BASOPHILS NFR BLD: 1.4 % (ref 0–2)
BUN SERPL-MCNC: 16 MG/DL (ref 9–20)
CALCIUM SERPL-MCNC: 9 MG/DL (ref 8.6–10.4)
EOSINOPHIL # BLD AUTO: 0.3 K/UL (ref 0–0.7)
EOSINOPHIL NFR BLD: 4.3 % (ref 0–4)
ERYTHROCYTE [DISTWIDTH] IN BLOOD BY AUTOMATED COUNT: 12.6 % (ref 11.5–14.5)
GFR NON-AFRICAN AMERICAN: > 60
HGB BLD-MCNC: 15.4 G/DL (ref 12–18)
LYMPHOCYTES # BLD AUTO: 2.1 K/UL (ref 1–4.3)
LYMPHOCYTES NFR BLD AUTO: 26 % (ref 20–40)
MCH RBC QN AUTO: 30.6 PG (ref 27–31)
MCHC RBC AUTO-ENTMCNC: 34.5 G/DL (ref 33–37)
MCV RBC AUTO: 88.5 FL (ref 80–94)
MONOCYTES # BLD: 0.7 K/UL (ref 0–0.8)
MONOCYTES NFR BLD: 8.7 % (ref 0–10)
NEUTROPHILS # BLD: 4.8 K/UL (ref 1.8–7)
NEUTROPHILS NFR BLD AUTO: 59.6 % (ref 50–75)
NRBC BLD AUTO-RTO: 0 % (ref 0–2)
PLATELET # BLD: 317 K/UL (ref 130–400)
PMV BLD AUTO: 7.8 FL (ref 7.2–11.7)
RBC # BLD AUTO: 5.02 MIL/UL (ref 4.4–5.9)
WBC # BLD AUTO: 8 K/UL (ref 4.8–10.8)

## 2018-11-16 RX ADMIN — VANCOMYCIN HYDROCHLORIDE SCH MLS/HR: 1 INJECTION, POWDER, LYOPHILIZED, FOR SOLUTION INTRAVENOUS at 11:41

## 2018-11-16 RX ADMIN — TAZOBACTAM SODIUM AND PIPERACILLIN SODIUM SCH MLS/HR: 375; 3 INJECTION, SOLUTION INTRAVENOUS at 06:00

## 2018-11-16 RX ADMIN — VANCOMYCIN HYDROCHLORIDE SCH MLS/HR: 1 INJECTION, POWDER, LYOPHILIZED, FOR SOLUTION INTRAVENOUS at 04:17

## 2018-11-16 RX ADMIN — TAZOBACTAM SODIUM AND PIPERACILLIN SODIUM SCH MLS/HR: 375; 3 INJECTION, SOLUTION INTRAVENOUS at 10:53

## 2018-11-16 NOTE — CP.PCM.DIS
<Ayo Velez - Last Filed: 11/16/18 18:00>





Provider





- Provider


Date of Admission: 


11/10/18 17:19





Attending physician: 


Farhat Lee DO





Consults: 


Ortho: Dr. Thomson


ID: Dr. Toro


Time Spent in preparation of Discharge (in minutes): 35





Hospital Course





- Lab Results


Lab Results: 


                                  Micro Results





11/10/18 15:53   Blood   Blood Culture - Final


                            NO GROWTH AFTER 5 DAYS


11/10/18 15:53   Blood   Gram Stain - Final


                            TEST NOT PERFORMED


11/10/18 15:53   Blood   Blood Culture - Final


                            NO GROWTH AFTER 5 DAYS


11/10/18 15:53   Blood   Gram Stain - Final


                            TEST NOT PERFORMED


11/10/18 15:51   Hand - Left   Gram Stain - Final


11/10/18 15:51   Hand - Left   Wound Culture - Final


                            Methicillin Resistant S Aureus





                             Most Recent Lab Values











WBC  8.0 K/uL (4.8-10.8)   11/16/18  06:18    


 


RBC  5.02 Mil/uL (4.40-5.90)   11/16/18  06:18    


 


Hgb  15.4 g/dL (12.0-18.0)   11/16/18  06:18    


 


Hct  44.5 % (35.0-51.0)   11/16/18  06:18    


 


MCV  88.5 fL (80.0-94.0)   11/16/18  06:18    


 


MCH  30.6 pg (27.0-31.0)   11/16/18  06:18    


 


MCHC  34.5 g/dL (33.0-37.0)   11/16/18  06:18    


 


RDW  12.6 % (11.5-14.5)   11/16/18  06:18    


 


Plt Count  317 K/uL (130-400)   11/16/18  06:18    


 


MPV  7.8 fL (7.2-11.7)   11/16/18  06:18    


 


Neut % (Auto)  59.6 % (50.0-75.0)   11/16/18  06:18    


 


Lymph % (Auto)  26.0 % (20.0-40.0)   11/16/18  06:18    


 


Mono % (Auto)  8.7 % (0.0-10.0)   11/16/18  06:18    


 


Eos % (Auto)  4.3 % (0.0-4.0)  H  11/16/18  06:18    


 


Baso % (Auto)  1.4 % (0.0-2.0)   11/16/18  06:18    


 


Neut # (Auto)  4.8 K/uL (1.8-7.0)   11/16/18  06:18    


 


Lymph # (Auto)  2.1 K/uL (1.0-4.3)   11/16/18  06:18    


 


Mono # (Auto)  0.7 K/uL (0.0-0.8)   11/16/18  06:18    


 


Eos # (Auto)  0.3 K/uL (0.0-0.7)   11/16/18  06:18    


 


Baso # (Auto)  0.1 K/uL (0.0-0.2)   11/16/18  06:18    


 


PT  12.7 SECONDS (9.7-12.2)  H  11/10/18  16:43    


 


INR  1.2   11/10/18  16:43    


 


APTT  33 SECONDS (21-34)   11/10/18  16:43    


 


Sodium  138 mmol/L (132-148)   11/16/18  06:18    


 


Potassium  4.2 mmol/L (3.6-5.2)   11/16/18  06:18    


 


Chloride  100 mmol/L ()   11/16/18  06:18    


 


Carbon Dioxide  27 mmol/L (22-30)   11/16/18  06:18    


 


Anion Gap  15  (10-20)   11/16/18  06:18    


 


BUN  16 mg/dL (9-20)   11/16/18  06:18    


 


Creatinine  1.1 mg/dL (0.8-1.5)   11/16/18  06:18    


 


Est GFR ( Amer)  > 60   11/16/18  06:18    


 


Est GFR (Non-Af Amer)  > 60   11/16/18  06:18    


 


Random Glucose  94 mg/dL ()   11/16/18  06:18    


 


Hemoglobin A1c  5.6 % (4.2-6.5)   11/10/18  20:03    


 


Calcium  9.0 mg/dl (8.6-10.4)   11/16/18  06:18    


 


Phosphorus  4.3 mg/dL (2.5-4.5)   11/16/18  06:18    


 


Magnesium  2.2 mg/dL (1.6-2.3)   11/16/18  06:18    


 


Total Bilirubin  0.5 mg/dL (0.2-1.3)   11/16/18  06:18    


 


AST  26 U/L (17-59)   11/16/18  06:18    


 


ALT  28 U/L (21-72)   11/16/18  06:18    


 


Alkaline Phosphatase  73 U/L ()   11/16/18  06:18    


 


Total Protein  7.3 g/dL (6.3-8.3)   11/16/18  06:18    


 


Albumin  4.0 g/dL (3.5-5.0)   11/16/18  06:18    


 


Globulin  3.3 gm/dL (2.2-3.9)   11/16/18  06:18    


 


Albumin/Globulin Ratio  1.2  (1.0-2.1)   11/16/18  06:18    


 


Urine Color  Straw  (YELLOW)   11/10/18  15:51    


 


Urine Clarity  Clear  (Clear)   11/10/18  15:51    


 


Urine pH  6.0  (5.0-8.0)   11/10/18  15:51    


 


Ur Specific Gravity  1.008  (1.003-1.030)   11/10/18  15:51    


 


Urine Protein  Negative mg/dL (NEGATIVE)   11/10/18  15:51    


 


Urine Glucose (UA)  Normal mg/dL (Normal)   11/10/18  15:51    


 


Urine Ketones  Negative mg/dL (NEGATIVE)   11/10/18  15:51    


 


Urine Blood  Negative  (NEGATIVE)   11/10/18  15:51    


 


Urine Nitrate  Negative  (NEGATIVE)   11/10/18  15:51    


 


Urine Bilirubin  Negative  (NEGATIVE)   11/10/18  15:51    


 


Urine Urobilinogen  Normal mg/dL (0.2-1.0)   11/10/18  15:51    


 


Ur Leukocyte Esterase  Neg Farzana/uL (Negative)   11/10/18  15:51    


 


Urine WBC (Auto)  1 /hpf (0-5)   11/10/18  15:51    


 


Urine RBC (Auto)  1 /hpf (0-3)   11/10/18  15:51    


 


Vancomycin Trough  13.1 ug/mL (5.0-10.0)  H  11/15/18  11:10    


 


HIV 1&2 Antibody Screen  Negative  (NEGATIVE)   11/10/18  20:03    














- Hospital Course


Hospital Course: 





On admission:


Mr. Marie is a 43 year old male with a past medical history of 

nephrolithiasis who presents with complaints left hand/thumb swelling/pain with 

associated sanguinopurulent drainage.  Patient states that his wound began as a 

bump on the evening of 11/5/18 and worsened.  He denies any trauma to the left 

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 

construction work.  Over the course of his wound he has attempted several 

modalities to treat his wound including sucking on it, poking wound with pin and

trying to open it up, warm salt water, bottled water, and aloe-vera.  He 

eventually went to a clinic and was prescribed Keflex 500 BID, which he has been

taking since 11/6.  Patient does admit to subjective fever on Monday which 

resolved with ibuprofen.





Hospital course: 


Patient was admitted for pyogenic granuloma of left hand. ID Dr. Toro was 

consulted who treated patient with Vancomycin and Zosyn after wound cultures 

were positive for MRSA. Hand Surgery Dr. Thomson was consulted who recommended 

betadine soaks, with no surgical intervention necessary after left hand/thumb 

significantly improved. Blood cultures were negative for growth. Patient was 

afebrile and with no leukocytosis noted. Patient received Tdap. On discharge, 

patient had good range of motion of fingers on left hand, able to move left 

thumb without pain, with no active drainage noted from granuloma. Patient was 

given instructions of how to change dressings and was provided with supplies. 





Imaging: 


- Left Hand X-ray:  There is localized enlarged heterogeneous appearance of the 

soft tissues adjacent to the radial margin of the 1st metacarpal with presumed 

involvement of the thenar eminence and extending to the level of the triquetrum.

Findings may represent a cellulitis and possible abscess. Follow up additional 

imaging such as a CT with IV contrast is recommended. 


- Left Hand CT (11/10/18): Findings are consistent with a small elliptical 

shaped abscess collection with surrounding is infiltration changes consistent 

with cellulitis in the soft tissues of the lateral hand (radial aspect) at the 

level of the 1st metacarpal extending from distal metacarpal proximally to the 

level of the triquetrum..  No evidence of subcutaneous emphysema.  No definitive

bony destructive changes suggest osteomyelitis at this time however early 

osteomyelitis not excluded.  Follow-up MRI may be prudent for further evaluation


- Left hand MRI: Again identified is a moderate-sized abscess collection seen 

within the radial sided soft tissues at the radial aspect of the 1st metacarpal 

measuring up to 2.3 x 1.0 x 2.7 centimeters.  Associated prominent reticulation 

and edema.  The edema appears to abut the radial sided cortex of the 1st 

metacarpal bone; however, there is no gross signal abnormality within the 1st 

metacarpal to suggest an acute osteomyelitis. 


Continued interval follow-up and/or post treatment interval follow-up may be 

helpful if clinically indicated to exclude a developing acute and or early acute

osteomyelitis.





Discharge instructions: 


Please follow up with [address]

at  in 1 to 2 weeks. For your hand, we recommend you follow up with 

the wound clinic in [place] at [address] [place] [address] by calling [phone]. Keep area clean and dry. You have been given instructions on how to 

clean the area and change dressings daily. You have been given a prescription 

for antibiotics. If you experience worsening of pain or swelling, along with 

fevers and chills, please seek medical care at the clinic or the Emergency 

Department. 





Prescriptions: 


Clindamycin 300mg by mouth 1 tablet at 8am, 12pm and 8pm for 7 days. Disp # 21 

(twenty one) 





Discharge Exam





- Head Exam


Head Exam: ATRAUMATIC, NORMOCEPHALIC





- Eye Exam


Eye Exam: EOMI





- ENT Exam


ENT Exam: Mucous Membranes Moist





- Respiratory Exam


Respiratory Exam: Clear to PA & Lateral.  absent: Rales, Rhonchi, Wheezes, 

Respiratory Distress, Stridor





- Cardiovascular Exam


Cardiovascular Exam: REGULAR RHYTHM, +S1, +S2.  absent: Gallop, Rubs, Systolic 

Murmur





- GI/Abdominal Exam


GI & Abdominal Exam: Normal Bowel Sounds, Soft.  absent: Distended, Firm, 

Guarding, Hernia, Rigid, Tenderness





- Extremities Exam


Additional comments: 





no calf tenderness, no pedal edema. 





Left hand: significantly improved, no active drainage. erythema, edema and 

tenderness decreased from prior. no active discharge. Able to make a fist, good 

ROM of left thumb with no pain or limitation of motion. Capillary refill 

<2seconds. Good radial and ulnar pulses





Left axilla: no lymphadenopathy. 





- Neurological Exam


Neurological exam: Alert, Oriented x3





- Psychiatric Exam


Psychiatric exam: Normal Affect, Normal Mood





Discharge Plan





- Discharge Medications


Prescriptions: 


RX: Clindamycin [Cleocin] 300 mg PO TID 7 Days #21 cap





- Follow Up Plan


Condition: STABLE


Disposition: HOME/ ROUTINE


Instructions:  Clindamycin (Systemic), Cellulitis (Skin Infection), Adult (DC)


Additional Instructions: 


Please follow up with [address]

 at  in 1 to 2 weeks. For your hand, we recommend you follow up with

 the wound clinic in [place] at [address] [place] [address] by calling 

[phone]. Keep area clean and dry. You have been given instructions on how 

to clean the area and change dressings daily. You have been given a prescription

 for antibiotics. If you experience worsening of pain or swelling, along with 

fevers and chills, please seek medical care at the clinic or the Emergency 

Department. 





Prescriptions: 


Clindamycin 300mg by mouth 1 tablet at 8am, 12pm and 8pm for 7 days. Disp # 21 

(twenty one) 


Referrals: 


Kathya Christianson MD [Staff Provider] - 





<Farhat Lee - Last Filed: 11/16/18 19:02>





Provider





- Provider


Date of Admission: 


11/10/18 17:19





Attending physician: 


Farhat Lee DO








Hospital Course





- Lab Results


Lab Results: 


                                  Micro Results





11/10/18 15:53   Blood   Blood Culture - Final


                            NO GROWTH AFTER 5 DAYS


11/10/18 15:53   Blood   Gram Stain - Final


                            TEST NOT PERFORMED


11/10/18 15:53   Blood   Blood Culture - Final


                            NO GROWTH AFTER 5 DAYS


11/10/18 15:53   Blood   Gram Stain - Final


                            TEST NOT PERFORMED


11/10/18 15:51   Hand - Left   Gram Stain - Final


11/10/18 15:51   Hand - Left   Wound Culture - Final


                            Methicillin Resistant S Aureus





                             Most Recent Lab Values











WBC  8.0 K/uL (4.8-10.8)   11/16/18  06:18    


 


RBC  5.02 Mil/uL (4.40-5.90)   11/16/18  06:18    


 


Hgb  15.4 g/dL (12.0-18.0)   11/16/18  06:18    


 


Hct  44.5 % (35.0-51.0)   11/16/18  06:18    


 


MCV  88.5 fL (80.0-94.0)   11/16/18  06:18    


 


MCH  30.6 pg (27.0-31.0)   11/16/18  06:18    


 


MCHC  34.5 g/dL (33.0-37.0)   11/16/18  06:18    


 


RDW  12.6 % (11.5-14.5)   11/16/18  06:18    


 


Plt Count  317 K/uL (130-400)   11/16/18  06:18    


 


MPV  7.8 fL (7.2-11.7)   11/16/18  06:18    


 


Neut % (Auto)  59.6 % (50.0-75.0)   11/16/18  06:18    


 


Lymph % (Auto)  26.0 % (20.0-40.0)   11/16/18  06:18    


 


Mono % (Auto)  8.7 % (0.0-10.0)   11/16/18  06:18    


 


Eos % (Auto)  4.3 % (0.0-4.0)  H  11/16/18  06:18    


 


Baso % (Auto)  1.4 % (0.0-2.0)   11/16/18  06:18    


 


Neut # (Auto)  4.8 K/uL (1.8-7.0)   11/16/18  06:18    


 


Lymph # (Auto)  2.1 K/uL (1.0-4.3)   11/16/18  06:18    


 


Mono # (Auto)  0.7 K/uL (0.0-0.8)   11/16/18  06:18    


 


Eos # (Auto)  0.3 K/uL (0.0-0.7)   11/16/18  06:18    


 


Baso # (Auto)  0.1 K/uL (0.0-0.2)   11/16/18  06:18    


 


PT  12.7 SECONDS (9.7-12.2)  H  11/10/18  16:43    


 


INR  1.2   11/10/18  16:43    


 


APTT  33 SECONDS (21-34)   11/10/18  16:43    


 


Sodium  138 mmol/L (132-148)   11/16/18  06:18    


 


Potassium  4.2 mmol/L (3.6-5.2)   11/16/18  06:18    


 


Chloride  100 mmol/L ()   11/16/18  06:18    


 


Carbon Dioxide  27 mmol/L (22-30)   11/16/18  06:18    


 


Anion Gap  15  (10-20)   11/16/18  06:18    


 


BUN  16 mg/dL (9-20)   11/16/18  06:18    


 


Creatinine  1.1 mg/dL (0.8-1.5)   11/16/18  06:18    


 


Est GFR ( Amer)  > 60   11/16/18  06:18    


 


Est GFR (Non-Af Amer)  > 60   11/16/18  06:18    


 


Random Glucose  94 mg/dL ()   11/16/18  06:18    


 


Hemoglobin A1c  5.6 % (4.2-6.5)   11/10/18  20:03    


 


Calcium  9.0 mg/dl (8.6-10.4)   11/16/18  06:18    


 


Phosphorus  4.3 mg/dL (2.5-4.5)   11/16/18  06:18    


 


Magnesium  2.2 mg/dL (1.6-2.3)   11/16/18  06:18    


 


Total Bilirubin  0.5 mg/dL (0.2-1.3)   11/16/18  06:18    


 


AST  26 U/L (17-59)   11/16/18  06:18    


 


ALT  28 U/L (21-72)   11/16/18  06:18    


 


Alkaline Phosphatase  73 U/L ()   11/16/18  06:18    


 


Total Protein  7.3 g/dL (6.3-8.3)   11/16/18  06:18    


 


Albumin  4.0 g/dL (3.5-5.0)   11/16/18  06:18    


 


Globulin  3.3 gm/dL (2.2-3.9)   11/16/18  06:18    


 


Albumin/Globulin Ratio  1.2  (1.0-2.1)   11/16/18  06:18    


 


Urine Color  Straw  (YELLOW)   11/10/18  15:51    


 


Urine Clarity  Clear  (Clear)   11/10/18  15:51    


 


Urine pH  6.0  (5.0-8.0)   11/10/18  15:51    


 


Ur Specific Gravity  1.008  (1.003-1.030)   11/10/18  15:51    


 


Urine Protein  Negative mg/dL (NEGATIVE)   11/10/18  15:51    


 


Urine Glucose (UA)  Normal mg/dL (Normal)   11/10/18  15:51    


 


Urine Ketones  Negative mg/dL (NEGATIVE)   11/10/18  15:51    


 


Urine Blood  Negative  (NEGATIVE)   11/10/18  15:51    


 


Urine Nitrate  Negative  (NEGATIVE)   11/10/18  15:51    


 


Urine Bilirubin  Negative  (NEGATIVE)   11/10/18  15:51    


 


Urine Urobilinogen  Normal mg/dL (0.2-1.0)   11/10/18  15:51    


 


Ur Leukocyte Esterase  Neg Farzana/uL (Negative)   11/10/18  15:51    


 


Urine WBC (Auto)  1 /hpf (0-5)   11/10/18  15:51    


 


Urine RBC (Auto)  1 /hpf (0-3)   11/10/18  15:51    


 


Vancomycin Trough  13.1 ug/mL (5.0-10.0)  H  11/15/18  11:10    


 


HIV 1&2 Antibody Screen  Negative  (NEGATIVE)   11/10/18  20:03    














Attending/Attestation





- Attestation


I have personally seen and examined this patient.: Yes


I have fully participated in the care of the patient.: Yes


I have reviewed all pertinent clinical information, including history, physical 

exam and plan: Yes


Notes (Text): 





11/16/18 19:02


Medical attending: Patient was seen and examined with the medical residents. He 

was having hemodialysis when we saw him. I reviewed the above note by medical 

resident and agree





As mentioned previously the patient was able to actively move his thumb without 

any pain. We unwrapped the dressing over his hand and reexamined the area the 

ascending his thumb he looked well. It was not actively bleeding, it was dry, 

there was no oozing or pus noted.





He could move his thumb and all motions without any pain he reported it felt 

normal





He'll need to continue with oral antibiotics for several more days. We also gave

him a basin some supplies of 4 x 4's, Kerlix wraps, sterile tape, as well as 

sterile saline. We advised the patient that even though he was much better now 

that ideally he needs to follow-up at [facility]





Farhat Lee

## 2018-11-16 NOTE — CP.PCM.PN
Subjective





- Date & Time of Evaluation


Date of Evaluation: 11/16/18


Time of Evaluation: 07:03





Objective





- Vital Signs/Intake and Output


Vital Signs (last 24 hours): 


                                        











Temp Pulse Resp BP Pulse Ox


 


 98.4 F   66   20   121/80   96 


 


 11/16/18 00:00  11/16/18 00:00  11/16/18 00:00  11/16/18 00:00  11/16/18 00:00











- Medications


Medications: 


                               Current Medications





Acetaminophen (Tylenol 325mg Tab)  650 mg PO Q6 PRN


   PRN Reason: Pain or Fever


Heparin Sodium (Porcine) (Heparin)  5,000 units SC Q8 PUNEET


   Last Admin: 11/16/18 05:23 Dose:  5,000 units


Piperacillin Sod/Tazobactam Sod (Zosyn 3.375 Gm Iv Premix)  3.375 gm in 50 mls @

100 mls/hr IVPB Q6H PUNEET; Protocol


   Last Admin: 11/16/18 06:00 Dose:  100 mls/hr


Vancomycin/Sodium Chloride (Vancomycin 1 Gm/Ns 200 Ml)  1 gm in 200 mls @ 133.

333 mls/hr IVPB Q8H PUNEET; Protocol


   Last Admin: 11/16/18 04:17 Dose:  133.333 mls/hr











- Labs


Labs: 


                                        





                                 11/16/18 06:18 





                                 11/16/18 06:18 





                                        











PT  12.7 SECONDS (9.7-12.2)  H  11/10/18  16:43    


 


INR  1.2   11/10/18  16:43    


 


APTT  33 SECONDS (21-34)   11/10/18  16:43

## 2025-07-07 NOTE — CP.PCM.HP
History of Present Illness





- History of Present Illness


History of Present Illness: 


Mr. Marie is a 43 year old male with a past medical history of 

nephrolithiasis who presents with complaints left hand/thumb swelling/pain with 

associated sanguinopurulent drainage.  Patient states that his wound began as a 

bump on the evening of 11/5/18 and worsened.  He denies any trauma to the left 

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 

construction work.  Over the course of his wound he has attempted several 

modalities to treat his wound including sucking on it, poking wound with pin and

trying to open it up, warm salt water, bottled water, and aloe-vera.  He 

eventually went to a clinic and was prescribed Keflex 500 BID, which he has been

taking since 11/6.  Patient does admit to subjective fever on Monday which 

resolved with ibuprofen. 





ROS


   POSITIVES: As stated above


   NEGATIVES:  Chills, headache, Chest pain, SOB, abdominal pain, nausea, 

vomiting, diarrhea, constipation, urinary symptoms. 





PMHx: Nephrolithiasis


PSHx: Possible Colonoscopy 7-8 years ago


Allergies: NKDA


SocialHx: Works as a construction work, smokes Cigars social (hasn't smoked any 

in 3 months), Social EtOH Use


Hos: [facility]


FamHx: Father/Mother - HTN, Diabetes


Meds: PRN Motrin 





PMD: None 











Present on Admission





- Present on Admission


Any Indicators Present on Admission: No





Review of Systems





- Review of Systems


All systems: reviewed and no additional remarkable complaints except (As per 

HPI)


Review of Systems: 


As per HPI








Past Patient History





- Past Social History


Smoking Status: Never Smoked





- RENAL


Hx Chronic Kidney Disease: Yes


Hx Kidney Stones: Yes





- PSYCHIATRIC


Hx Substance Use: No





- SURGICAL HISTORY


Hx Surgeries: No





- ANESTHESIA


Hx Anesthesia: No


Hx Anesthesia Reactions: No


Hx Malignant Hyperthermia: No





Meds


Allergies/Adverse Reactions: 


                                    Allergies











Allergy/AdvReac Type Severity Reaction Status Date / Time


 


No Known Allergies Allergy   Verified 11/10/18 13:53














Physical Exam





- Constitutional


Appears: Well, Non-toxic, No Acute Distress





- Head Exam


Head Exam: ATRAUMATIC, NORMAL INSPECTION, NORMOCEPHALIC





- Eye Exam


Eye Exam: EOMI, Normal appearance.  absent: Scleral icterus





- ENT Exam


ENT Exam: Mucous Membranes Moist





- Neck Exam


Neck exam: Positive for: Normal Inspection.  Negative for: Lymphadenopathy





- Respiratory Exam


Respiratory Exam: Clear to Auscultation Bilateral, NORMAL BREATHING PATTERN.  

absent: Rales, Rhonchi, Wheezes





- Cardiovascular Exam


Cardiovascular Exam: RRR, +S1, +S2





- GI/Abdominal Exam


GI & Abdominal Exam: Normal Bowel Sounds, Soft.  absent: Tenderness





- Extremities Exam


Extremities exam: Positive for: normal capillary refill, pedal pulses present.  

Negative for: pedal edema


Additional comments: 


Left Hand/Thumb. Swollen, Erythematous with sanguinopurulent drainage. Tender to

palpation. Irregular border 


NO axillary lymphadenopathy


Left axilla with two papular, erythematous masses about .5inch in diameter each.

Tender to palpation, fluctuant. 








- Back Exam


Back exam: NORMAL INSPECTION





- Neurological Exam


Neurological exam: Alert, Oriented x3





- Psychiatric Exam


Psychiatric exam: Normal Affect, Normal Mood





- Skin


Skin Exam: Warm





Results





- Vital Signs


Recent Vital Signs: 





                                Last Vital Signs











Temp  98.4 F   11/10/18 13:55


 


Pulse  70   11/10/18 15:01


 


Resp  18   11/10/18 15:01


 


BP  136/86   11/10/18 15:01


 


Pulse Ox  97   11/10/18 15:48














- Labs


Result Diagrams: 


                                 11/10/18 15:51





                                 11/10/18 15:51


Labs: 





                         Laboratory Results - last 24 hr











  11/10/18 11/10/18 11/10/18





  15:51 15:51 15:51


 


WBC  7.6  


 


RBC  4.84  


 


Hgb  14.8  


 


Hct  42.7  


 


MCV  88.2  


 


MCH  30.5  


 


MCHC  34.6  


 


RDW  12.6  


 


Plt Count  276  


 


MPV  7.7  


 


Neut % (Auto)  64.6  


 


Lymph % (Auto)  24.7  


 


Mono % (Auto)  6.9  


 


Eos % (Auto)  2.3  


 


Baso % (Auto)  1.5  


 


Neut # (Auto)  4.9  


 


Lymph # (Auto)  1.9  


 


Mono # (Auto)  0.5  


 


Eos # (Auto)  0.2  


 


Baso # (Auto)  0.1  


 


PT   


 


INR   


 


APTT   


 


Sodium   139 


 


Potassium   3.6 


 


Chloride   101 


 


Carbon Dioxide   30 


 


Anion Gap   12 


 


BUN   14 


 


Creatinine   0.8 


 


Est GFR ( Amer)   > 60 


 


Est GFR (Non-Af Amer)   > 60 


 


Random Glucose   91 


 


Calcium   8.8 


 


Total Bilirubin   0.6 


 


AST   24 


 


ALT   31 


 


Alkaline Phosphatase   99 


 


Total Protein   7.5 


 


Albumin   4.2 


 


Globulin   3.2 


 


Albumin/Globulin Ratio   1.3 


 


Urine Color    Straw


 


Urine Clarity    Clear


 


Urine pH    6.0


 


Ur Specific Gravity    1.008


 


Urine Protein    Negative


 


Urine Glucose (UA)    Normal


 


Urine Ketones    Negative


 


Urine Blood    Negative


 


Urine Nitrate    Negative


 


Urine Bilirubin    Negative


 


Urine Urobilinogen    Normal


 


Ur Leukocyte Esterase    Neg


 


Urine WBC (Auto)    1


 


Urine RBC (Auto)    1














  11/10/18





  16:43


 


WBC 


 


RBC 


 


Hgb 


 


Hct 


 


MCV 


 


MCH 


 


MCHC 


 


RDW 


 


Plt Count 


 


MPV 


 


Neut % (Auto) 


 


Lymph % (Auto) 


 


Mono % (Auto) 


 


Eos % (Auto) 


 


Baso % (Auto) 


 


Neut # (Auto) 


 


Lymph # (Auto) 


 


Mono # (Auto) 


 


Eos # (Auto) 


 


Baso # (Auto) 


 


PT  12.7 H


 


INR  1.2


 


APTT  33


 


Sodium 


 


Potassium 


 


Chloride 


 


Carbon Dioxide 


 


Anion Gap 


 


BUN 


 


Creatinine 


 


Est GFR ( Amer) 


 


Est GFR (Non-Af Amer) 


 


Random Glucose 


 


Calcium 


 


Total Bilirubin 


 


AST 


 


ALT 


 


Alkaline Phosphatase 


 


Total Protein 


 


Albumin 


 


Globulin 


 


Albumin/Globulin Ratio 


 


Urine Color 


 


Urine Clarity 


 


Urine pH 


 


Ur Specific Gravity 


 


Urine Protein 


 


Urine Glucose (UA) 


 


Urine Ketones 


 


Urine Blood 


 


Urine Nitrate 


 


Urine Bilirubin 


 


Urine Urobilinogen 


 


Ur Leukocyte Esterase 


 


Urine WBC (Auto) 


 


Urine RBC (Auto) 














Assessment & Plan





- Assessment and Plan (Free Text)


Assessment: 


43 year old male with PMHx of nephrolithiasis admitted for evaluation and 

treatment of left hand/thumb cellulitis.  





Plan: 


Left Hand/Thumb Cellulitis


No fever/Leukocytosis


Left Hand X-ray (Admission): PENDING read


Left Hand CT (11/10/18): PENDING


Hand Surgery Consulted (Dr. Thomson)


ID Consulted (Dr. Toro)


ED: Clindamycin, Zosyn, Vancomycin,


-Blood Cultures | Wound Culture


-Hemoglobin A1C & HIV 1&2


-NPO Diet incase of surgery


-Preoperative CXR and EKG


Meds:


   Vancomycin 1 gram Daily IV


   Zosyn 3.375 Q6H IV


   Tylenol


   NS @ 125mls/HR


   Tdap IM ONCE





Proph


NPO 


NO pharmocological DVT proph in case of surgery


SCD's


GI Proph Not Indicated 





Patient seen and discussed with Attending


Leodan Sierra, PGY-2
Yes